# Patient Record
Sex: FEMALE | Race: WHITE | NOT HISPANIC OR LATINO | Employment: FULL TIME | URBAN - METROPOLITAN AREA
[De-identification: names, ages, dates, MRNs, and addresses within clinical notes are randomized per-mention and may not be internally consistent; named-entity substitution may affect disease eponyms.]

---

## 2020-12-18 ENCOUNTER — OFFICE VISIT (OUTPATIENT)
Dept: OBSTETRICS AND GYNECOLOGY | Facility: CLINIC | Age: 40
End: 2020-12-18

## 2020-12-18 VITALS
HEIGHT: 68 IN | SYSTOLIC BLOOD PRESSURE: 130 MMHG | BODY MASS INDEX: 37.28 KG/M2 | DIASTOLIC BLOOD PRESSURE: 80 MMHG | WEIGHT: 246 LBS

## 2020-12-18 DIAGNOSIS — N64.4 BREAST TENDERNESS: Primary | ICD-10-CM

## 2020-12-18 DIAGNOSIS — N64.89 BREAST ASYMMETRY: ICD-10-CM

## 2020-12-18 PROCEDURE — 99203 OFFICE O/P NEW LOW 30 MIN: CPT | Performed by: NURSE PRACTITIONER

## 2020-12-18 NOTE — PROGRESS NOTES
"Chief Complaint   Patient presents with   • Breast Problem     New patient - Pt c/o swelling and pain in right breast.         SUBJECTIVE:     Quin Carrington is a 39 y.o. No obstetric history on file. who presents with breast swelling and pain for 4-5 months.  She denies any dimpling, puckering, nipple changes, or drainage from nipple. Reports right breast is now much smaller than the left she first noticed 4-5 months ago. She has significant pain throughout the right breast. This is a new problem. LMP 11/27/2020. She has never completed mammogram. She denies family hx of breast cancer. She drinks several servings of caffeine per day    This is my first time meeting Quin Carrington  She is new to our office    Past Medical History:   Diagnosis Date   • Patient denies medical problems       Past Surgical History:   Procedure Laterality Date   • TUBAL ABDOMINAL LIGATION        Social History     Tobacco Use   • Smoking status: Current Every Day Smoker   Substance Use Topics   • Alcohol use: Not on file   • Drug use: Not on file     OB History   No obstetric history on file.        Review of Systems   Constitutional: Negative for chills, fatigue and fever.   Gastrointestinal: Negative for abdominal distention, abdominal pain, nausea and vomiting.   Genitourinary: Negative for menstrual problem, vaginal bleeding, vaginal discharge and vaginal pain.        + right breast pain  + left breast swelling   Musculoskeletal: Negative for back pain and gait problem.   Skin: Negative for rash.   Neurological: Negative for dizziness and headaches.   Hematological: Does not bruise/bleed easily.   Psychiatric/Behavioral: Negative for behavioral problems.       OBJECTIVE:   Vitals:    12/18/20 1321   BP: 130/80   Weight: 112 kg (246 lb)   Height: 172.7 cm (68\")        Physical Exam  Vitals signs and nursing note reviewed.   Constitutional:       Appearance: Normal appearance.   HENT:      Head: Normocephalic and atraumatic. "   Neck:      Musculoskeletal: Normal range of motion.   Cardiovascular:      Rate and Rhythm: Normal rate.   Pulmonary:      Effort: Pulmonary effort is normal.   Chest:      Breasts: Breasts are asymmetrical (right breast significantly smaller than left).         Right: Tenderness present. No swelling, bleeding, inverted nipple, mass, nipple discharge or skin change.         Left: Tenderness present. No swelling, bleeding, inverted nipple, mass, nipple discharge or skin change.   Musculoskeletal: Normal range of motion.   Lymphadenopathy:      Upper Body:      Right upper body: No supraclavicular, axillary or pectoral adenopathy.      Left upper body: No supraclavicular, axillary or pectoral adenopathy.   Skin:     General: Skin is warm and dry.   Neurological:      General: No focal deficit present.      Mental Status: She is alert and oriented to person, place, and time.      Cranial Nerves: No cranial nerve deficit.   Psychiatric:         Mood and Affect: Mood normal.         Behavior: Behavior normal.         Thought Content: Thought content normal.         Judgment: Judgment normal.       ASSESSMENT:   1) Right breast pain  2) New onset breast asymmetry    PLAN:   Encouraged decreased caffeine intake  Bilateral Dx mammogram and bilateral breast u/s ordered  Will call with results  Encouraged to schedule annual GYN exam    Follow up: PRN and annually      Carmen Fernandez, APRN  12/18/2020  13:29 EST

## 2021-02-02 ENCOUNTER — TELEPHONE (OUTPATIENT)
Dept: OBSTETRICS AND GYNECOLOGY | Facility: CLINIC | Age: 41
End: 2021-02-02

## 2021-08-25 ENCOUNTER — APPOINTMENT (OUTPATIENT)
Dept: GENERAL RADIOLOGY | Facility: HOSPITAL | Age: 41
End: 2021-08-25

## 2021-08-25 ENCOUNTER — HOSPITAL ENCOUNTER (EMERGENCY)
Facility: HOSPITAL | Age: 41
Discharge: LEFT BEFORE DISCHARGE | End: 2021-08-25

## 2021-08-25 VITALS — TEMPERATURE: 96.9 F | HEIGHT: 68 IN | HEART RATE: 112 BPM | BODY MASS INDEX: 37.4 KG/M2 | OXYGEN SATURATION: 98 %

## 2021-08-25 PROCEDURE — 99211 OFF/OP EST MAY X REQ PHY/QHP: CPT

## 2021-08-25 NOTE — ED TRIAGE NOTES
Pt ambulated to triage desk with mask in place and c/o nasal congestion, short of air, back pain on inspiration, and has had 2 negative COVID tests in the last week (last one was yesterday). This RN wearing mask and eye protection with hand hygiene prior to and after encounter.

## 2021-12-22 ENCOUNTER — HOSPITAL ENCOUNTER (OUTPATIENT)
Facility: HOSPITAL | Age: 41
Setting detail: OBSERVATION
Discharge: HOME | End: 2021-12-24
Attending: EMERGENCY MEDICINE | Admitting: HOSPITALIST

## 2021-12-22 ENCOUNTER — APPOINTMENT (OUTPATIENT)
Dept: CT IMAGING | Facility: HOSPITAL | Age: 41
End: 2021-12-22

## 2021-12-22 DIAGNOSIS — U07.1 COVID-19: ICD-10-CM

## 2021-12-22 DIAGNOSIS — M54.50 ACUTE BILATERAL LOW BACK PAIN WITHOUT SCIATICA: Primary | ICD-10-CM

## 2021-12-22 DIAGNOSIS — R50.9 FEVER AND CHILLS: ICD-10-CM

## 2021-12-22 LAB
ALBUMIN SERPL-MCNC: 4.2 G/DL (ref 3.5–5.2)
ALBUMIN/GLOB SERPL: 1.6 G/DL
ALP SERPL-CCNC: 72 U/L (ref 39–117)
ALT SERPL W P-5'-P-CCNC: 15 U/L (ref 1–33)
ANION GAP SERPL CALCULATED.3IONS-SCNC: 11.8 MMOL/L (ref 5–15)
AST SERPL-CCNC: 12 U/L (ref 1–32)
BASOPHILS # BLD AUTO: 0.02 10*3/MM3 (ref 0–0.2)
BASOPHILS NFR BLD AUTO: 0.4 % (ref 0–1.5)
BILIRUB SERPL-MCNC: 0.3 MG/DL (ref 0–1.2)
BILIRUB UR QL STRIP: NEGATIVE
BUN SERPL-MCNC: 14 MG/DL (ref 6–20)
BUN/CREAT SERPL: 21.2 (ref 7–25)
CALCIUM SPEC-SCNC: 9.2 MG/DL (ref 8.6–10.5)
CHLORIDE SERPL-SCNC: 106 MMOL/L (ref 98–107)
CLARITY UR: CLEAR
CO2 SERPL-SCNC: 21.2 MMOL/L (ref 22–29)
COLOR UR: YELLOW
CREAT SERPL-MCNC: 0.66 MG/DL (ref 0.57–1)
CRP SERPL-MCNC: 0.37 MG/DL (ref 0–0.5)
D-LACTATE SERPL-SCNC: 0.6 MMOL/L (ref 0.5–2)
DEPRECATED RDW RBC AUTO: 41 FL (ref 37–54)
EOSINOPHIL # BLD AUTO: 0.07 10*3/MM3 (ref 0–0.4)
EOSINOPHIL NFR BLD AUTO: 1.3 % (ref 0.3–6.2)
ERYTHROCYTE [DISTWIDTH] IN BLOOD BY AUTOMATED COUNT: 11.7 % (ref 12.3–15.4)
ERYTHROCYTE [SEDIMENTATION RATE] IN BLOOD: 10 MM/HR (ref 0–20)
GFR SERPL CREATININE-BSD FRML MDRD: 98 ML/MIN/1.73
GLOBULIN UR ELPH-MCNC: 2.7 GM/DL
GLUCOSE SERPL-MCNC: 104 MG/DL (ref 65–99)
GLUCOSE UR STRIP-MCNC: NEGATIVE MG/DL
HCG SERPL QL: NEGATIVE
HCT VFR BLD AUTO: 38 % (ref 34–46.6)
HGB BLD-MCNC: 13.6 G/DL (ref 12–15.9)
HGB UR QL STRIP.AUTO: NEGATIVE
HOLD SPECIMEN: NORMAL
IMM GRANULOCYTES # BLD AUTO: 0.03 10*3/MM3 (ref 0–0.05)
IMM GRANULOCYTES NFR BLD AUTO: 0.5 % (ref 0–0.5)
KETONES UR QL STRIP: NEGATIVE
LEUKOCYTE ESTERASE UR QL STRIP.AUTO: NEGATIVE
LIPASE SERPL-CCNC: 26 U/L (ref 13–60)
LYMPHOCYTES # BLD AUTO: 0.7 10*3/MM3 (ref 0.7–3.1)
LYMPHOCYTES NFR BLD AUTO: 12.8 % (ref 19.6–45.3)
MCH RBC QN AUTO: 34.5 PG (ref 26.6–33)
MCHC RBC AUTO-ENTMCNC: 35.8 G/DL (ref 31.5–35.7)
MCV RBC AUTO: 96.4 FL (ref 79–97)
MONOCYTES # BLD AUTO: 0.41 10*3/MM3 (ref 0.1–0.9)
MONOCYTES NFR BLD AUTO: 7.5 % (ref 5–12)
NEUTROPHILS NFR BLD AUTO: 4.24 10*3/MM3 (ref 1.7–7)
NEUTROPHILS NFR BLD AUTO: 77.5 % (ref 42.7–76)
NITRITE UR QL STRIP: NEGATIVE
NRBC BLD AUTO-RTO: 0 /100 WBC (ref 0–0.2)
PH UR STRIP.AUTO: 8 [PH] (ref 5–8)
PLATELET # BLD AUTO: 190 10*3/MM3 (ref 140–450)
PMV BLD AUTO: 10.7 FL (ref 6–12)
POTASSIUM SERPL-SCNC: 4 MMOL/L (ref 3.5–5.2)
PROCALCITONIN SERPL-MCNC: 0.07 NG/ML (ref 0–0.25)
PROT SERPL-MCNC: 6.9 G/DL (ref 6–8.5)
PROT UR QL STRIP: NEGATIVE
RBC # BLD AUTO: 3.94 10*6/MM3 (ref 3.77–5.28)
SARS-COV-2 RNA PNL SPEC NAA+PROBE: DETECTED
SODIUM SERPL-SCNC: 139 MMOL/L (ref 136–145)
SP GR UR STRIP: 1.02 (ref 1–1.03)
UROBILINOGEN UR QL STRIP: NORMAL
WBC NRBC COR # BLD: 5.47 10*3/MM3 (ref 3.4–10.8)
WHOLE BLOOD HOLD SPECIMEN: NORMAL
WHOLE BLOOD HOLD SPECIMEN: NORMAL

## 2021-12-22 PROCEDURE — 80053 COMPREHEN METABOLIC PANEL: CPT | Performed by: EMERGENCY MEDICINE

## 2021-12-22 PROCEDURE — G0378 HOSPITAL OBSERVATION PER HR: HCPCS

## 2021-12-22 PROCEDURE — 85025 COMPLETE CBC W/AUTO DIFF WBC: CPT | Performed by: EMERGENCY MEDICINE

## 2021-12-22 PROCEDURE — 25010000002 VANCOMYCIN 10 G RECONSTITUTED SOLUTION: Performed by: EMERGENCY MEDICINE

## 2021-12-22 PROCEDURE — 87040 BLOOD CULTURE FOR BACTERIA: CPT | Performed by: EMERGENCY MEDICINE

## 2021-12-22 PROCEDURE — 87635 SARS-COV-2 COVID-19 AMP PRB: CPT | Performed by: EMERGENCY MEDICINE

## 2021-12-22 PROCEDURE — 36415 COLL VENOUS BLD VENIPUNCTURE: CPT | Performed by: EMERGENCY MEDICINE

## 2021-12-22 PROCEDURE — 86140 C-REACTIVE PROTEIN: CPT | Performed by: EMERGENCY MEDICINE

## 2021-12-22 PROCEDURE — 96375 TX/PRO/DX INJ NEW DRUG ADDON: CPT

## 2021-12-22 PROCEDURE — C9803 HOPD COVID-19 SPEC COLLECT: HCPCS

## 2021-12-22 PROCEDURE — 84145 PROCALCITONIN (PCT): CPT | Performed by: EMERGENCY MEDICINE

## 2021-12-22 PROCEDURE — 99284 EMERGENCY DEPT VISIT MOD MDM: CPT

## 2021-12-22 PROCEDURE — 81003 URINALYSIS AUTO W/O SCOPE: CPT | Performed by: EMERGENCY MEDICINE

## 2021-12-22 PROCEDURE — 25010000002 PIPERACILLIN SOD-TAZOBACTAM PER 1 G: Performed by: EMERGENCY MEDICINE

## 2021-12-22 PROCEDURE — 25010000002 KETOROLAC TROMETHAMINE PER 15 MG: Performed by: EMERGENCY MEDICINE

## 2021-12-22 PROCEDURE — 83605 ASSAY OF LACTIC ACID: CPT | Performed by: EMERGENCY MEDICINE

## 2021-12-22 PROCEDURE — 74176 CT ABD & PELVIS W/O CONTRAST: CPT

## 2021-12-22 PROCEDURE — 83690 ASSAY OF LIPASE: CPT | Performed by: EMERGENCY MEDICINE

## 2021-12-22 PROCEDURE — 96374 THER/PROPH/DIAG INJ IV PUSH: CPT

## 2021-12-22 PROCEDURE — 96365 THER/PROPH/DIAG IV INF INIT: CPT

## 2021-12-22 PROCEDURE — 85652 RBC SED RATE AUTOMATED: CPT | Performed by: EMERGENCY MEDICINE

## 2021-12-22 PROCEDURE — 84703 CHORIONIC GONADOTROPIN ASSAY: CPT | Performed by: EMERGENCY MEDICINE

## 2021-12-22 RX ORDER — KETOROLAC TROMETHAMINE 15 MG/ML
15 INJECTION, SOLUTION INTRAMUSCULAR; INTRAVENOUS ONCE
Status: COMPLETED | OUTPATIENT
Start: 2021-12-22 | End: 2021-12-22

## 2021-12-22 RX ORDER — DEXAMETHASONE SODIUM PHOSPHATE 10 MG/ML
6 INJECTION INTRAMUSCULAR; INTRAVENOUS DAILY
Status: DISCONTINUED | OUTPATIENT
Start: 2021-12-23 | End: 2021-12-24

## 2021-12-22 RX ORDER — ACETAMINOPHEN 500 MG
1000 TABLET ORAL ONCE
Status: COMPLETED | OUTPATIENT
Start: 2021-12-22 | End: 2021-12-22

## 2021-12-22 RX ORDER — NITROGLYCERIN 0.4 MG/1
0.4 TABLET SUBLINGUAL
Status: DISCONTINUED | OUTPATIENT
Start: 2021-12-22 | End: 2021-12-23

## 2021-12-22 RX ORDER — SODIUM CHLORIDE 0.9 % (FLUSH) 0.9 %
10 SYRINGE (ML) INJECTION AS NEEDED
Status: DISCONTINUED | OUTPATIENT
Start: 2021-12-22 | End: 2021-12-24 | Stop reason: HOSPADM

## 2021-12-22 RX ADMIN — ACETAMINOPHEN 1000 MG: 500 TABLET ORAL at 18:31

## 2021-12-22 RX ADMIN — SODIUM CHLORIDE 1000 ML: 9 INJECTION, SOLUTION INTRAVENOUS at 17:41

## 2021-12-22 RX ADMIN — TAZOBACTAM SODIUM AND PIPERACILLIN SODIUM 3.38 G: 375; 3 INJECTION, SOLUTION INTRAVENOUS at 21:31

## 2021-12-22 RX ADMIN — KETOROLAC TROMETHAMINE 15 MG: 15 INJECTION, SOLUTION INTRAMUSCULAR; INTRAVENOUS at 17:37

## 2021-12-22 RX ADMIN — VANCOMYCIN HYDROCHLORIDE 1750 MG: 10 INJECTION, POWDER, LYOPHILIZED, FOR SOLUTION INTRAVENOUS at 22:19

## 2021-12-22 NOTE — ED PROVIDER NOTES
EMERGENCY DEPARTMENT ENCOUNTER    Room Number:  N541/1  Date of encounter:  12/22/2021  PCP: Provider, No Known  Historian: Patient      H  Chief Complaint:   A complete HPI/ROS/PMH/PSH/SH/FH are unobtainable due to: None    Context: Quin Carrington is a 40 y.o. female who presents to the ED c/o low back pain and fever.  Severe bilateral low back pain for the last several days which is nonradiating.  Is worse with movement nothing makes it better or worse.  Patient was treated for UTI with antibiotics but has not gotten better.  This was done at [facility].  She went to work today and felt very poorly and fatigued and was found to have a fever of 101.    Patient had no cough or shortness of breath and no other associated symptoms other than myalgias and fatigue.  She said no recent procedures, no reason to have bacteremia, no history of IV drugs.      PAST MEDICAL HISTORY  Active Ambulatory Problems     Diagnosis Date Noted   • No Active Ambulatory Problems     Resolved Ambulatory Problems     Diagnosis Date Noted   • No Resolved Ambulatory Problems     Past Medical History:   Diagnosis Date   • Patient denies medical problems          PAST SURGICAL HISTORY  Past Surgical History:   Procedure Laterality Date   • TUBAL ABDOMINAL LIGATION           FAMILY HISTORY  Family History   Problem Relation Age of Onset   • No Known Problems Father    • No Known Problems Mother          SOCIAL HISTORY  Social History     Socioeconomic History   • Marital status: Single   Tobacco Use   • Smoking status: Current Every Day Smoker         ALLERGIES  Sulfa antibiotics        REVIEW OF SYSTEMS  Review of Systems     All systems reviewed and negative except for those discussed in HPI.       PHYSICAL EXAM    I have reviewed the triage vital signs and nursing notes.    ED Triage Vitals [12/22/21 1643]   Temp Heart Rate Resp BP SpO2   (!) 101.1 °F (38.4 °C) 106 18 -- 100 %      Temp src Heart Rate Source Patient Position BP  Location FiO2 (%)   -- Monitor -- -- --       Physical Exam  GENERAL: Nontoxic-appearing  HENT: nares patent  EYES: no scleral icterus  CV: Sinus tachycardia  RESPIRATORY: normal effort  ABDOMEN: soft, nontender palpation, bowel sounds present.  No CVA tenderness.  There is localized tenderness palpation through the mid lower lumbar spine with no swelling or step-off.  MUSCULOSKELETAL: no deformity.  Normal neuro exam with no signs of lumbar radiculopathy or radicular symptoms.  NEURO: alert, moves all extremities, follows commands  SKIN: warm, dry        LAB RESULTS  Recent Results (from the past 24 hour(s))   Comprehensive Metabolic Panel    Collection Time: 12/22/21  4:52 PM    Specimen: Blood   Result Value Ref Range    Glucose 104 (H) 65 - 99 mg/dL    BUN 14 6 - 20 mg/dL    Creatinine 0.66 0.57 - 1.00 mg/dL    Sodium 139 136 - 145 mmol/L    Potassium 4.0 3.5 - 5.2 mmol/L    Chloride 106 98 - 107 mmol/L    CO2 21.2 (L) 22.0 - 29.0 mmol/L    Calcium 9.2 8.6 - 10.5 mg/dL    Total Protein 6.9 6.0 - 8.5 g/dL    Albumin 4.20 3.50 - 5.20 g/dL    ALT (SGPT) 15 1 - 33 U/L    AST (SGOT) 12 1 - 32 U/L    Alkaline Phosphatase 72 39 - 117 U/L    Total Bilirubin 0.3 0.0 - 1.2 mg/dL    eGFR Non African Amer 98 >60 mL/min/1.73    Globulin 2.7 gm/dL    A/G Ratio 1.6 g/dL    BUN/Creatinine Ratio 21.2 7.0 - 25.0    Anion Gap 11.8 5.0 - 15.0 mmol/L   Lipase    Collection Time: 12/22/21  4:52 PM    Specimen: Blood   Result Value Ref Range    Lipase 26 13 - 60 U/L   Urinalysis With Microscopic If Indicated (No Culture) - Urine, Clean Catch    Collection Time: 12/22/21  4:52 PM    Specimen: Urine, Clean Catch   Result Value Ref Range    Color, UA Yellow Yellow, Straw    Appearance, UA Clear Clear    pH, UA 8.0 5.0 - 8.0    Specific Gravity, UA 1.021 1.005 - 1.030    Glucose, UA Negative Negative    Ketones, UA Negative Negative    Bilirubin, UA Negative Negative    Blood, UA Negative Negative    Protein, UA Negative Negative    Leuk  Esterase, UA Negative Negative    Nitrite, UA Negative Negative    Urobilinogen, UA 1.0 E.U./dL 0.2 - 1.0 E.U./dL   hCG, Serum, Qualitative    Collection Time: 12/22/21  4:52 PM    Specimen: Blood   Result Value Ref Range    HCG Qualitative Negative Negative   Green Top (Gel)    Collection Time: 12/22/21  4:52 PM   Result Value Ref Range    Extra Tube Hold for add-ons.    Lavender Top    Collection Time: 12/22/21  4:52 PM   Result Value Ref Range    Extra Tube hold for add-on    Light Blue Top    Collection Time: 12/22/21  4:52 PM   Result Value Ref Range    Extra Tube hold for add-on    CBC Auto Differential    Collection Time: 12/22/21  4:52 PM    Specimen: Blood   Result Value Ref Range    WBC 5.47 3.40 - 10.80 10*3/mm3    RBC 3.94 3.77 - 5.28 10*6/mm3    Hemoglobin 13.6 12.0 - 15.9 g/dL    Hematocrit 38.0 34.0 - 46.6 %    MCV 96.4 79.0 - 97.0 fL    MCH 34.5 (H) 26.6 - 33.0 pg    MCHC 35.8 (H) 31.5 - 35.7 g/dL    RDW 11.7 (L) 12.3 - 15.4 %    RDW-SD 41.0 37.0 - 54.0 fl    MPV 10.7 6.0 - 12.0 fL    Platelets 190 140 - 450 10*3/mm3    Neutrophil % 77.5 (H) 42.7 - 76.0 %    Lymphocyte % 12.8 (L) 19.6 - 45.3 %    Monocyte % 7.5 5.0 - 12.0 %    Eosinophil % 1.3 0.3 - 6.2 %    Basophil % 0.4 0.0 - 1.5 %    Immature Grans % 0.5 0.0 - 0.5 %    Neutrophils, Absolute 4.24 1.70 - 7.00 10*3/mm3    Lymphocytes, Absolute 0.70 0.70 - 3.10 10*3/mm3    Monocytes, Absolute 0.41 0.10 - 0.90 10*3/mm3    Eosinophils, Absolute 0.07 0.00 - 0.40 10*3/mm3    Basophils, Absolute 0.02 0.00 - 0.20 10*3/mm3    Immature Grans, Absolute 0.03 0.00 - 0.05 10*3/mm3    nRBC 0.0 0.0 - 0.2 /100 WBC   Sedimentation Rate    Collection Time: 12/22/21  4:52 PM    Specimen: Blood   Result Value Ref Range    Sed Rate 10 0 - 20 mm/hr   C-reactive Protein    Collection Time: 12/22/21  4:52 PM    Specimen: Blood   Result Value Ref Range    C-Reactive Protein 0.37 0.00 - 0.50 mg/dL   Procalcitonin    Collection Time: 12/22/21  4:52 PM    Specimen: Blood    Result Value Ref Range    Procalcitonin 0.07 0.00 - 0.25 ng/mL   Lactic Acid, Plasma    Collection Time: 12/22/21  8:17 PM    Specimen: Blood   Result Value Ref Range    Lactate 0.6 0.5 - 2.0 mmol/L   COVID-19,[facility] IN-HOUSE CEPHEID/YAYA NP SWAB IN TRANSPORT MEDIA 8-12 HR TAT - Swab, Nasopharynx    Collection Time: 12/22/21  8:18 PM    Specimen: Nasopharynx; Swab   Result Value Ref Range    COVID19 Detected (C) Not Detected - Ref. Range       Ordered the above labs and independently reviewed the results.        RADIOLOGY  CT Abdomen Pelvis Without Contrast    Result Date: 12/22/2021  CT ABDOMEN PELVIS WO CONTRAST-  CLINICAL HISTORY: Bilateral flank pain. Fever.  TECHNIQUE: Spiral CT images were acquired through the abdomen and pelvis with no oral or IV contrast and were reconstructed in 3 mm thick slices.  Radiation dose reduction techniques were utilized, including automated exposure control and exposure modulation based on body size.  COMPARISON: None  FINDINGS: Both kidneys are normal in size and shape and show no hydronephrosis. No radiopaque calculi are present within either renal collecting system or in the urinary bladder. No cystic or solid masses are identified. The liver and spleen and pancreas and adrenal glands appear within normal limits. The stomach and small and large bowel are unremarkable. There is no bowel distention. The uterus and ovaries are unremarkable except for the presence of bilateral tubal ligation clips. No abnormal masses or fluid collections are identified in the abdomen or pelvis. There are no hernias.      Unremarkable CT scan of the abdomen and pelvis. No cause for the patient's flank pain identified.  This report was finalized on 12/22/2021 7:51 PM by Dr. Sanjay Velez M.D.        I ordered the above noted radiological studies. Reviewed by me and discussed with radiologist.  See dictation for official radiology interpretation.      PROCEDURES    Procedures      MEDICATIONS  GIVEN IN ER    Medications   sodium chloride 0.9 % flush 10 mL (has no administration in time range)   enoxaparin (LOVENOX) syringe 40 mg (has no administration in time range)   dexamethasone (DECADRON) injection 6 mg (has no administration in time range)   nitroglycerin (NITROSTAT) SL tablet 0.4 mg (has no administration in time range)   sodium chloride 0.9 % bolus 1,000 mL (0 mL Intravenous Stopped 12/22/21 1856)   ketorolac (TORADOL) injection 15 mg (15 mg Intravenous Given 12/22/21 1737)   acetaminophen (TYLENOL) tablet 1,000 mg (1,000 mg Oral Given 12/22/21 1831)   vancomycin 1750 mg/500 mL 0.9% NS IVPB ([facility]) (1,750 mg Intravenous New Bag 12/22/21 2219)   piperacillin-tazobactam (ZOSYN) 3.375 g in iso-osmotic dextrose 50 ml (premix) (3.375 g Intravenous New Bag 12/22/21 2131)         PROGRESS, DATA ANALYSIS, CONSULTS, AND MEDICAL DECISION MAKING    All labs have been independently reviewed by me.  All radiology studies have been reviewed by me and discussed with radiologist dictating the report.   EKG's independently viewed and interpreted by me.  Discussion below represents my analysis of pertinent findings related to patient's condition, differential diagnosis, treatment plan and final disposition.        ED Course as of 12/22/21 2301   Wed Dec 22, 2021   2234 I spoke with Dr. Barrios who agrees to admit the patient.  She does have COVID-19, but no pulmonary findings.  The fever myalgias could be from COVID-19, but the focal midline back pain is concerning.  I have done blood cultures and given her empiric IV antibiotics.  No steroids are indicated for the COVID-19 at this point. [DP]   2300 Patient's present his normal CBC and chemistry [DP]   2300 Urinalysis negative [DP]   2300 Lactate and pro-Isreal normal [DP]   2300 hCG negative [DP]   2300 CT scan of the abdomen pelvis shows no acute process.  There is no urolithiasis or inflammatory changes in the collecting system or pelvis. [DP]   2300 Surprisingly her  Covid screen was positive. [DP]   2300 Certainly no sign of sepsis at this point, but I am still concerned about the localized pain in the back and the possibility of infection. [DP]      ED Course User Index  [DP] Ever Ames MD           PPE: The patient wore a surgical mask throughout the entire patient encounter. I wore an N95, eye protection gown and gloves    AS OF 23:01 EST VITALS:    BP - 116/57  HR - 75  TEMP - 99.5 °F (37.5 °C) (Oral)  O2 SATS - 95%        DIAGNOSIS  Final diagnoses:   Acute bilateral low back pain without sciatica   Fever and chills   COVID-19         DISPOSITION  Admit           Ever Ames MD  12/22/21 2301

## 2021-12-22 NOTE — ED NOTES
Pt refused COVID swab. This RN explained to RN that getting a swab is important for pt care. Pt okay'd. This RN will return to swab pt.      Hilaria Perez, LINDSAY  12/22/21 0867

## 2021-12-22 NOTE — ED TRIAGE NOTES
Bilateral flank pain started 1 week ago, pt also reports urinary frequency, pt states hx of kidney infections.     Pt arrives in triage with mask on. Triage staff wearing N95 masks and goggles.

## 2021-12-23 ENCOUNTER — APPOINTMENT (OUTPATIENT)
Dept: GENERAL RADIOLOGY | Facility: HOSPITAL | Age: 41
End: 2021-12-23

## 2021-12-23 PROCEDURE — 94799 UNLISTED PULMONARY SVC/PX: CPT

## 2021-12-23 PROCEDURE — 94640 AIRWAY INHALATION TREATMENT: CPT

## 2021-12-23 PROCEDURE — G0378 HOSPITAL OBSERVATION PER HR: HCPCS

## 2021-12-23 PROCEDURE — 25010000002 DEXAMETHASONE PER 1 MG: Performed by: HOSPITALIST

## 2021-12-23 PROCEDURE — 96375 TX/PRO/DX INJ NEW DRUG ADDON: CPT

## 2021-12-23 PROCEDURE — 96372 THER/PROPH/DIAG INJ SC/IM: CPT

## 2021-12-23 PROCEDURE — 71045 X-RAY EXAM CHEST 1 VIEW: CPT

## 2021-12-23 PROCEDURE — 25010000002 ENOXAPARIN PER 10 MG: Performed by: HOSPITALIST

## 2021-12-23 RX ORDER — GUAIFENESIN 600 MG/1
600 TABLET, EXTENDED RELEASE ORAL EVERY 12 HOURS SCHEDULED
Status: DISCONTINUED | OUTPATIENT
Start: 2021-12-23 | End: 2021-12-24 | Stop reason: HOSPADM

## 2021-12-23 RX ORDER — ALBUTEROL SULFATE 2.5 MG/3ML
2.5 SOLUTION RESPIRATORY (INHALATION) EVERY 6 HOURS PRN
Status: DISCONTINUED | OUTPATIENT
Start: 2021-12-23 | End: 2021-12-24 | Stop reason: HOSPADM

## 2021-12-23 RX ORDER — BUDESONIDE AND FORMOTEROL FUMARATE DIHYDRATE 160; 4.5 UG/1; UG/1
2 AEROSOL RESPIRATORY (INHALATION)
Status: DISCONTINUED | OUTPATIENT
Start: 2021-12-23 | End: 2021-12-24 | Stop reason: HOSPADM

## 2021-12-23 RX ORDER — ACETAMINOPHEN 500 MG
TABLET ORAL
Status: COMPLETED
Start: 2021-12-23 | End: 2021-12-23

## 2021-12-23 RX ORDER — KETOROLAC TROMETHAMINE 15 MG/ML
15 INJECTION, SOLUTION INTRAMUSCULAR; INTRAVENOUS ONCE
Status: DISCONTINUED | OUTPATIENT
Start: 2021-12-23 | End: 2021-12-23

## 2021-12-23 RX ORDER — ACETAMINOPHEN 500 MG
1000 TABLET ORAL ONCE
Status: DISCONTINUED | OUTPATIENT
Start: 2021-12-23 | End: 2021-12-23

## 2021-12-23 RX ORDER — CETIRIZINE HYDROCHLORIDE 10 MG/1
10 TABLET ORAL DAILY
Status: DISCONTINUED | OUTPATIENT
Start: 2021-12-23 | End: 2021-12-24

## 2021-12-23 RX ORDER — FAMOTIDINE 20 MG/1
20 TABLET, FILM COATED ORAL 2 TIMES DAILY
Status: DISCONTINUED | OUTPATIENT
Start: 2021-12-23 | End: 2021-12-24 | Stop reason: HOSPADM

## 2021-12-23 RX ORDER — ACETAMINOPHEN 325 MG/1
650 TABLET ORAL EVERY 6 HOURS PRN
Status: DISCONTINUED | OUTPATIENT
Start: 2021-12-23 | End: 2021-12-24 | Stop reason: HOSPADM

## 2021-12-23 RX ORDER — ZINC SULFATE 50(220)MG
220 CAPSULE ORAL DAILY
Status: DISCONTINUED | OUTPATIENT
Start: 2021-12-23 | End: 2021-12-24

## 2021-12-23 RX ORDER — ASCORBIC ACID 500 MG
500 TABLET ORAL DAILY
Status: DISCONTINUED | OUTPATIENT
Start: 2021-12-23 | End: 2021-12-24

## 2021-12-23 RX ORDER — MELATONIN
1000 DAILY
Status: DISCONTINUED | OUTPATIENT
Start: 2021-12-23 | End: 2021-12-24 | Stop reason: HOSPADM

## 2021-12-23 RX ADMIN — CETIRIZINE HYDROCHLORIDE 10 MG: 10 TABLET ORAL at 15:06

## 2021-12-23 RX ADMIN — ENOXAPARIN SODIUM 40 MG: 40 INJECTION SUBCUTANEOUS at 20:36

## 2021-12-23 RX ADMIN — FAMOTIDINE 20 MG: 20 TABLET, FILM COATED ORAL at 15:05

## 2021-12-23 RX ADMIN — Medication 220 MG: at 15:06

## 2021-12-23 RX ADMIN — GUAIFENESIN 600 MG: 600 TABLET, EXTENDED RELEASE ORAL at 20:36

## 2021-12-23 RX ADMIN — ACETAMINOPHEN 500 MG: 500 TABLET ORAL at 00:17

## 2021-12-23 RX ADMIN — BUDESONIDE AND FORMOTEROL FUMARATE DIHYDRATE 2 PUFF: 160; 4.5 AEROSOL RESPIRATORY (INHALATION) at 20:53

## 2021-12-23 RX ADMIN — OXYCODONE HYDROCHLORIDE AND ACETAMINOPHEN 500 MG: 500 TABLET ORAL at 15:05

## 2021-12-23 RX ADMIN — GUAIFENESIN 600 MG: 600 TABLET, EXTENDED RELEASE ORAL at 15:05

## 2021-12-23 RX ADMIN — DEXAMETHASONE SODIUM PHOSPHATE 6 MG: 10 INJECTION INTRAMUSCULAR; INTRAVENOUS at 08:41

## 2021-12-23 RX ADMIN — Medication 1000 UNITS: at 15:05

## 2021-12-23 RX ADMIN — ACETAMINOPHEN 650 MG: 325 TABLET, FILM COATED ORAL at 08:40

## 2021-12-23 RX ADMIN — FAMOTIDINE 20 MG: 20 TABLET, FILM COATED ORAL at 20:36

## 2021-12-23 NOTE — CONSULTS
LOS: 1 day   Patient Care Team:  Provider, No Known as PCP - General        Subjective       Attending MD : Abdoul Barrios MD    Patient Complaints: SOB         History of Present Illness  :40-year-old white female with no medical problems no medication presented to [facility] emergency room with low back pain myalgia fever chills but denies any shortness of breath or cough.  Patient work-up in ER revealed COVID-19 infection admit for management.  Patient also denies any loss of taste or smell.  Patient did not receive COVID-19 vaccine.        Patient Denies:  NV    PMH :   Acute bilateral low back pain without sciatica      Review of Systems:    SOB    Objective     Vital Signs  Temp:  [98 °F (36.7 °C)-101.1 °F (38.4 °C)] 98 °F (36.7 °C)  Heart Rate:  [] 52  Resp:  [16-18] 16  BP: (101-131)/(55-74) 101/55    Physical Exam:     General Appearance:    Alert, cooperative, in no acute distress   Head:    Normocephalic, without obvious abnormality, atraumatic   Eyes:            Lids and lashes normal, conjunctivae and sclerae normal, no   icterus, no pallor, corneas clear, PERRLA   Ears:    Ears appear intact with no abnormalities noted   Throat:   No oral lesions, no thrush, oral mucosa moist   Neck:   No adenopathy, supple, trachea midline, no thyromegaly, no     carotid bruit, no JVD   Back:     No kyphosis present, no scoliosis present, no skin lesions,       erythema or scars, no tenderness to percussion or                   palpation,   range of motion normal   Lungs:     Clear to auscultation,respirations regular, even and                   unlabored    Heart:    Regular rhythm and normal rate, normal S1 and S2, no            murmur, no gallop, no rub, no click   Breast Exam:    Deferred   Abdomen:     Normal bowel sounds, no masses, no organomegaly, soft        non-tender, non-distended, no guarding, no rebound                 tenderness   Genitalia:    Deferred   Extremities:   Moves all  extremities well, no edema, no cyanosis, no              redness   Pulses:   Pulses palpable and equal bilaterally   Skin:   No bleeding, bruising or rash   Lymph nodes:   No palpable adenopathy   Neurologic:   Cranial nerves 2 - 12 grossly intact, sensation intact, DTR        present and equal bilaterally          Results Review:    Lab Results (last 72 hours)     Procedure Component Value Units Date/Time    COVID PRE-OP / PRE-PROCEDURE SCREENING ORDER (NO ISOLATION) - Swab, Nasopharynx [[id number]]  (Abnormal) Collected: 12/22/21 2018    Specimen: Swab from Nasopharynx Updated: 12/22/21 2055    Narrative:      The following orders were created for panel order COVID PRE-OP / PRE-PROCEDURE SCREENING ORDER (NO ISOLATION) - Swab, Nasopharynx.  Procedure                               Abnormality         Status                     ---------                               -----------         ------                     COVID-19,[facility]HOU[facility]...[[id number]]  Abnormal            Final result                 Please view results for these tests on the individual orders.    COVID-19,[facility] CEPHEID/YAYA NP SWAB IN TRANSPORT MEDIA 8-12 HR TAT - Swab, Nasopharynx [[id number]]  (Abnormal) Collected: 12/22/21 2018    Specimen: Swab from Nasopharynx Updated: 12/22/21 2055     COVID19 Detected    Narrative:      Fact sheet for providers: https://www.fda.gov/media/851447/download    Fact sheet for patients: https://www.fda.gov/media/012720/download    Test performed by PCR.    Procalcitonin [[id number]]  (Normal) Collected: 12/22/21 1652    Specimen: Blood Updated: 12/22/21 2044     Procalcitonin 0.07 ng/mL     Narrative:      As a Marker for Sepsis (Non-Neonates):     1. <0.5 ng/mL represents a low risk of severe sepsis and/or septic shock.  2. >2 ng/mL represents a high risk of severe sepsis and/or septic shock.    As a Marker for Lower Respiratory Tract Infections that require antibiotic therapy:  PCT on Admission      "Antibiotic Therapy             6-12 Hrs later  >0.5                          Strongly Recommended            >0.25 - <0.5             Recommended  0.1 - 0.25                  Discouraged                       Remeasure/reassess PCT  <0.1                         Strongly Discouraged         Remeasure/reassess PCT      As 28 day mortality risk marker: \"Change in Procalcitonin Result\" (>80% or <=80%) if Day 0 (or Day 1) and Day 4 values are available. Refer to http://www.[facility]-pct-calculator.com/    Change in PCT <=80 %   A decrease of PCT levels below or equal to 80% defines a positive change in PCT test result representing a higher risk for 28-day all-cause mortality of patients diagnosed with severe sepsis or septic shock.    Change in PCT >80 %   A decrease of PCT levels of more than 80% defines a negative change in PCT result representing a lower risk for 28-day all-cause mortality of patients diagnosed with severe sepsis or septic shock.                Lactic Acid, Plasma [[id number]]  (Normal) Collected: 12/22/21 2017    Specimen: Blood Updated: 12/22/21 2041     Lactate 0.6 mmol/L     C-reactive Protein [[id number]]  (Normal) Collected: 12/22/21 1652    Specimen: Blood Updated: 12/22/21 2026     C-Reactive Protein 0.37 mg/dL     Blood Culture - Blood, Arm, Left [[id number]] Collected: 12/22/21 2017    Specimen: Blood from Arm, Left Updated: 12/22/21 2023    Blood Culture - Blood, Arm, Left [[id number]] Collected: 12/22/21 2017    Specimen: Blood from Arm, Left Updated: 12/22/21 2023    Sedimentation Rate [[id number]]  (Normal) Collected: 12/22/21 1652    Specimen: Blood Updated: 12/22/21 1849     Sed Rate 10 mm/hr     Rumsey Draw [[id number]] Collected: 12/22/21 1652    Specimen: Blood Updated: 12/22/21 1801    Narrative:      The following orders were created for panel order Rumsey Draw.  Procedure                               Abnormality         Status                     ---------                               " -----------         ------                     Green Top (Gel)[[id number]]                                  Final result               Lavender Top[[id number]]                                     Final result               Gold Top - SST[[id number]]                                                              Light Blue Top[[id number]]                                   Final result                 Please view results for these tests on the individual orders.    Green Top (Gel) [[id number]] Collected: 12/22/21 1652    Specimen: Blood Updated: 12/22/21 1801     Extra Tube Hold for add-ons.     Comment: Auto resulted.       Lavender Top [[id number]] Collected: 12/22/21 1652    Specimen: Blood Updated: 12/22/21 1801     Extra Tube hold for add-on     Comment: Auto resulted       Light Blue Top [[id number]] Collected: 12/22/21 1652    Specimen: Blood Updated: 12/22/21 1801     Extra Tube hold for add-on     Comment: Auto resulted       Comprehensive Metabolic Panel [[id number]]  (Abnormal) Collected: 12/22/21 1652    Specimen: Blood Updated: 12/22/21 1720     Glucose 104 mg/dL      BUN 14 mg/dL      Creatinine 0.66 mg/dL      Sodium 139 mmol/L      Potassium 4.0 mmol/L      Chloride 106 mmol/L      CO2 21.2 mmol/L      Calcium 9.2 mg/dL      Total Protein 6.9 g/dL      Albumin 4.20 g/dL      ALT (SGPT) 15 U/L      AST (SGOT) 12 U/L      Alkaline Phosphatase 72 U/L      Total Bilirubin 0.3 mg/dL      eGFR Non African Amer 98 mL/min/1.73      Globulin 2.7 gm/dL      A/G Ratio 1.6 g/dL      BUN/Creatinine Ratio 21.2     Anion Gap 11.8 mmol/L     Narrative:      GFR Normal >60  Chronic Kidney Disease <60  Kidney Failure <15      Lipase [[id number]]  (Normal) Collected: 12/22/21 1652    Specimen: Blood Updated: 12/22/21 1720     Lipase 26 U/L     hCG, Serum, Qualitative [[id number]]  (Normal) Collected: 12/22/21 1652    Specimen: Blood Updated: 12/22/21 1712     HCG Qualitative Negative    Urinalysis With Microscopic If Indicated (No  Culture) - Urine, Clean Catch [[id number]]  (Normal) Collected: 12/22/21 1652    Specimen: Urine, Clean Catch Updated: 12/22/21 1707     Color, UA Yellow     Appearance, UA Clear     pH, UA 8.0     Specific Gravity, UA 1.021     Glucose, UA Negative     Ketones, UA Negative     Bilirubin, UA Negative     Blood, UA Negative     Protein, UA Negative     Leuk Esterase, UA Negative     Nitrite, UA Negative     Urobilinogen, UA 1.0 E.U./dL    Narrative:      Urine microscopic not indicated.    CBC & Differential [[id number]]  (Abnormal) Collected: 12/22/21 1652    Specimen: Blood Updated: 12/22/21 1704    Narrative:      The following orders were created for panel order CBC & Differential.  Procedure                               Abnormality         Status                     ---------                               -----------         ------                     CBC Auto Differential[[id number]]        Abnormal            Final result                 Please view results for these tests on the individual orders.    CBC Auto Differential [[id number]]  (Abnormal) Collected: 12/22/21 1652    Specimen: Blood Updated: 12/22/21 1704     WBC 5.47 10*3/mm3      RBC 3.94 10*6/mm3      Hemoglobin 13.6 g/dL      Hematocrit 38.0 %      MCV 96.4 fL      MCH 34.5 pg      MCHC 35.8 g/dL      RDW 11.7 %      RDW-SD 41.0 fl      MPV 10.7 fL      Platelets 190 10*3/mm3      Neutrophil % 77.5 %      Lymphocyte % 12.8 %      Monocyte % 7.5 %      Eosinophil % 1.3 %      Basophil % 0.4 %      Immature Grans % 0.5 %      Neutrophils, Absolute 4.24 10*3/mm3      Lymphocytes, Absolute 0.70 10*3/mm3      Monocytes, Absolute 0.41 10*3/mm3      Eosinophils, Absolute 0.07 10*3/mm3      Basophils, Absolute 0.02 10*3/mm3      Immature Grans, Absolute 0.03 10*3/mm3      nRBC 0.0 /100 WBC               Imaging Results (Last 72 Hours)     Procedure Component Value Units Date/Time    XR Chest 1 View [[id number]] Resulted: 12/23/21 1236     Updated: 12/23/21  1321    CT Abdomen Pelvis Without Contrast [[id number]] Collected: 12/22/21 1947     Updated: 12/22/21 1954    Narrative:      CT ABDOMEN PELVIS WO CONTRAST-     CLINICAL HISTORY: Bilateral flank pain. Fever.     TECHNIQUE: Spiral CT images were acquired through the abdomen and pelvis  with no oral or IV contrast and were reconstructed in 3 mm thick slices.     Radiation dose reduction techniques were utilized, including automated  exposure control and exposure modulation based on body size.     COMPARISON: None     FINDINGS: Both kidneys are normal in size and shape and show no  hydronephrosis. No radiopaque calculi are present within either renal  collecting system or in the urinary bladder. No cystic or solid masses  are identified. The liver and spleen and pancreas and adrenal glands  appear within normal limits. The stomach and small and large bowel are  unremarkable. There is no bowel distention. The uterus and ovaries are  unremarkable except for the presence of bilateral tubal ligation clips.  No abnormal masses or fluid collections are identified in the abdomen or  pelvis. There are no hernias.       Impression:      Unremarkable CT scan of the abdomen and pelvis. No cause for  the patient's flank pain identified.     This report was finalized on 12/22/2021 7:51 PM by Dr. Sanjay Velez M.D.               Medication Review:      Hospital Medications (active)       Dose Frequency Start End    acetaminophen (TYLENOL) tablet 650 mg 650 mg Every 6 Hours PRN 12/23/2021     Admin Instructions: Do not exceed 4 grams of acetaminophen in a 24 hr period. Max dose of 2gm for AST/ALT greater than 120 units/L      If given for pain, use the following pain scale:   Mild Pain = Pain Score of 1-3, CPOT 1-2  Moderate Pain = Pain Score of 4-6, CPOT 3-4  Severe Pain = Pain Score of 7-10, CPOT 5-8    Route: Oral    albuterol (PROVENTIL) nebulizer solution 0.083% 2.5 mg/3mL 2.5 mg Every 6 Hours PRN 12/23/2021     Route:  Nebulization    ascorbic acid (VITAMIN C) tablet 500 mg 500 mg Daily 12/23/2021     Route: Oral    budesonide-formoterol (SYMBICORT) 160-4.5 MCG/ACT inhaler 2 puff 2 puff 2 Times Daily - RT 12/23/2021     Admin Instructions:  Shake well.  Rinse mouth after use, do not swallow water.  Send aerosols to pharmacy in ziplock bag for proper disposal.    Route: Inhalation    cetirizine (zyrTEC) tablet 10 mg 10 mg Daily 12/23/2021     Route: Oral    cholecalciferol (VITAMIN D3) tablet 1,000 Units 1,000 Units Daily 12/23/2021     Route: Oral    dexamethasone (DECADRON) injection 6 mg 6 mg Daily 12/23/2021     Admin Instructions: For IV administration.  May be pushed over a minimum of 1 minute.    Route: Intravenous    enoxaparin (LOVENOX) syringe 40 mg 40 mg Nightly 12/23/2021     Admin Instructions: Give subcutaneous in abdomen only. Do not massage site after injection.    Route: Subcutaneous    famotidine (PEPCID) tablet 20 mg 20 mg 2 Times Daily 12/23/2021     Route: Oral    guaiFENesin (MUCINEX) 12 hr tablet 600 mg 600 mg Every 12 Hours Scheduled 12/23/2021     Admin Instructions: Caution: Look alike/sound alike drug alert               Do not crush, split, or chew.    Route: Oral    sodium chloride 0.9 % flush 10 mL 10 mL As Needed 12/22/2021     Route: Intravenous    Cosign for Ordering: Accepted by Ever Ames MD on 12/22/2021  5:57 PM    zinc sulfate (ZINCATE) capsule 220 mg 220 mg Daily 12/23/2021     Route: Oral          Assessment/Plan         Acute bilateral low back pain without sciatica    Covid 19  PNA    Plan :       O2 sat 95%  On RA  supp care  decadron  Will follow  Thank  you      Suly Michelle MD  12/23/21  13:33 EST

## 2021-12-23 NOTE — ED NOTES
Nursing report ED to floor  Quin Carrington  40 y.o.  female    HPI :   Chief Complaint   Patient presents with   • Flank Pain       Admitting doctor:   Abdoul Barrios MD    Admitting diagnosis:   The primary encounter diagnosis was Acute bilateral low back pain without sciatica. Diagnoses of Fever and chills and COVID-19 were also pertinent to this visit.    Code status:   Current Code Status     Date Active Code Status Order ID Comments User Context       Not on file    Advance Care Planning Activity          Allergies:   Sulfa antibiotics    Intake and Output    Intake/Output Summary (Last 24 hours) at 12/22/2021 2136  Last data filed at 12/22/2021 1856  Gross per 24 hour   Intake 1000 ml   Output --   Net 1000 ml       Weight:       12/22/21  1741   Weight: 86.2 kg (190 lb)       Most recent vitals:   Vitals:    12/22/21 2001 12/22/21 2019 12/22/21 2101 12/22/21 2131   BP: 111/71  124/66 116/57   Pulse: 81  86 75   Resp:       Temp:  99.5 °F (37.5 °C)     TempSrc:  Oral     SpO2: 93%  98% 95%   Weight:       Height:           Active LDAs/IV Access:   Lines, Drains & Airways     Active LDAs     Name Placement date Placement time Site Days    Peripheral IV 12/22/21 1737 Left Wrist 12/22/21  1737  Wrist  less than 1                Labs (abnormal labs have a star):   Labs Reviewed   COVID-19,[facility]HOU[facility] CEPHEID/YAYA, NP SWAB IN TRANSPORT MEDIA 8-12 HR TAT - Abnormal; Notable for the following components:       Result Value    COVID19 Detected (*)     All other components within normal limits    Narrative:     Fact sheet for providers: https://www.fda.gov/media/455868/download    Fact sheet for patients: https://www.fda.gov/media/436772/download    Test performed by PCR.   COMPREHENSIVE METABOLIC PANEL - Abnormal; Notable for the following components:    Glucose 104 (*)     CO2 21.2 (*)     All other components within normal limits    Narrative:     GFR Normal >60  Chronic Kidney Disease <60  Kidney Failure <15    "  CBC WITH AUTO DIFFERENTIAL - Abnormal; Notable for the following components:    MCH 34.5 (*)     MCHC 35.8 (*)     RDW 11.7 (*)     Neutrophil % 77.5 (*)     Lymphocyte % 12.8 (*)     All other components within normal limits   LIPASE - Normal   URINALYSIS W/ MICROSCOPIC IF INDICATED (NO CULTURE) - Normal    Narrative:     Urine microscopic not indicated.   HCG, SERUM, QUALITATIVE - Normal   SEDIMENTATION RATE - Normal   C-REACTIVE PROTEIN - Normal   LACTIC ACID, PLASMA - Normal   PROCALCITONIN - Normal    Narrative:     As a Marker for Sepsis (Non-Neonates):     1. <0.5 ng/mL represents a low risk of severe sepsis and/or septic shock.  2. >2 ng/mL represents a high risk of severe sepsis and/or septic shock.    As a Marker for Lower Respiratory Tract Infections that require antibiotic therapy:  PCT on Admission     Antibiotic Therapy             6-12 Hrs later  >0.5                          Strongly Recommended            >0.25 - <0.5             Recommended  0.1 - 0.25                  Discouraged                       Remeasure/reassess PCT  <0.1                         Strongly Discouraged         Remeasure/reassess PCT      As 28 day mortality risk marker: \"Change in Procalcitonin Result\" (>80% or <=80%) if Day 0 (or Day 1) and Day 4 values are available. Refer to http://www.[employer]-pct-calculator.com/    Change in PCT <=80 %   A decrease of PCT levels below or equal to 80% defines a positive change in PCT test result representing a higher risk for 28-day all-cause mortality of patients diagnosed with severe sepsis or septic shock.    Change in PCT >80 %   A decrease of PCT levels of more than 80% defines a negative change in PCT result representing a lower risk for 28-day all-cause mortality of patients diagnosed with severe sepsis or septic shock.               COVID PRE-OP / PRE-PROCEDURE SCREENING ORDER (NO ISOLATION)    Narrative:     The following orders were created for panel order COVID PRE-OP / " PRE-PROCEDURE SCREENING ORDER (NO ISOLATION) - Swab, Nasopharynx.  Procedure                               Abnormality         Status                     ---------                               -----------         ------                     COVID-19,[facility]-HOU[facility]...[[id number]]  Abnormal            Final result                 Please view results for these tests on the individual orders.   BLOOD CULTURE   BLOOD CULTURE   RAINBOW DRAW    Narrative:     The following orders were created for panel order [place] Draw.  Procedure                               Abnormality         Status                     ---------                               -----------         ------                     Green Top (Gel)[[id number]]                                  Final result               Lavender Top[[id number]]                                     Final result               Gold Top - SST[[id number]]                                                              Light Blue Top[[id number]]                                   Final result                 Please view results for these tests on the individual orders.   CBC AND DIFFERENTIAL    Narrative:     The following orders were created for panel order CBC & Differential.  Procedure                               Abnormality         Status                     ---------                               -----------         ------                     CBC Auto Differential[[id number]]        Abnormal            Final result                 Please view results for these tests on the individual orders.   GREEN TOP   LAVENDER TOP   LIGHT BLUE TOP       EKG:   No orders to display       Meds given in ED:   Medications   sodium chloride 0.9 % flush 10 mL (has no administration in time range)   vancomycin 1750 mg/500 mL 0.9% NS IVPB ([facility]) (has no administration in time range)   piperacillin-tazobactam (ZOSYN) 3.375 g in iso-osmotic dextrose 50 ml (premix) (3.375 g Intravenous New Bag 12/22/21 2230)    sodium chloride 0.9 % bolus 1,000 mL (0 mL Intravenous Stopped 12/22/21 1856)   ketorolac (TORADOL) injection 15 mg (15 mg Intravenous Given 12/22/21 1737)   acetaminophen (TYLENOL) tablet 1,000 mg (1,000 mg Oral Given 12/22/21 1831)       Imaging results:  CT Abdomen Pelvis Without Contrast    Result Date: 12/22/2021  Unremarkable CT scan of the abdomen and pelvis. No cause for the patient's flank pain identified.  This report was finalized on 12/22/2021 7:51 PM by Dr. Sanjay Velez M.D.        Ambulatory status:   - ad magda    Social issues:   Social History     Socioeconomic History   • Marital status: Single   Tobacco Use   • Smoking status: Current Every Day Smoker       NIH Stroke Scale:        Nursing report ED to floor:     Hilaria Perez RN  12/22/21 2508

## 2021-12-23 NOTE — PLAN OF CARE
Goal Outcome Evaluation:   Patient still complaining of persistent B flank pain but also reports that she has had this same symptom 4 times in several months, each concurrent with URIs. She did have COVID in January. Aox4, VSS. Mild respiratory symptoms, but no O2 needs. Labwork, monitoring. Probably home tomorrow.

## 2021-12-23 NOTE — ED NOTES
"This RN attempted to obtain Covid swab at this time, Pt repeatedly states \"I am scared, can I just wait. Or can I go get it tomorrow from somewhere else?\"  This RN informed Pt that another RN would be back shortly to try again and obtain swab.    Patient was placed in face mask during first look triage.  Patient was wearing a face mask throughout encounter.  I wore personal protective equipment throughout the encounter.  Hand hygiene was performed before and after patient encounter.       Chasidy Gauthier, RN  12/22/21 1950    "

## 2021-12-23 NOTE — PLAN OF CARE
Goal Outcome Evaluation:  Plan of Care Reviewed With: patient        Progress: no change     Patient admitted to the unit on RA. She is independent with care. A0x4. Makes needs known. C/o body aches. Tylenol given. Reg diet. CT abd/pelvis negative with no acute findings. Urine- negative results. Blood Cx pending.

## 2021-12-23 NOTE — H&P
"History and physical    Primary care physician   Not known    Chief complaint  Low back pain  Myalgia  Fever chills    History of present illness  40-year-old white female with no medical problems no medication presented to [facility] emergency room with low back pain myalgia fever chills but denies any shortness of breath or cough.  Patient work-up in ER revealed COVID-19 infection admit for management.  Patient also denies any loss of taste or smell.  Patient did not receive COVID-19 vaccine.    PAST MEDICAL HISTORY   Unremarkable      PAST SURGICAL HISTORY              Procedure Laterality Date   • TUBAL ABDOMINAL LIGATION             FAMILY HISTORY           Problem Relation Age of Onset   • No Known Problems Father     • No Known Problems Mother        SOCIAL HISTORY                Socioeconomic History   • Marital status: Single   Tobacco Use   • Smoking status: Current Every Day Smoker         ALLERGIES  Sulfa antibiotics  Home medications reviewed     REVIEW OF SYSTEMS  All systems reviewed and negative except for those discussed in HPI.      PHYSICAL EXAM  Blood pressure 125/74, pulse 73, temperature 100.5 °F (38.1 °C), temperature source Oral, resp. rate 18, height 175.3 cm (69\"), weight 86.2 kg (190 lb), SpO2 96 %.    GENERAL: Nontoxic-appearing  HENT: nares patent  EYES: no scleral icterus  CV: Sinus tachycardia  RESPIRATORY: normal effort  ABDOMEN: soft, nontender bowel sounds present.    MUSCULOSKELETAL: no deformity.  Normal neuro exam   NEURO: alert, moves all extremities, follows commands  SKIN: warm, dry     LAB RESULTS  Lab Results (last 24 hours)     Procedure Component Value Units Date/Time    COVID PRE-OP / PRE-PROCEDURE SCREENING ORDER (NO ISOLATION) - Swab, Nasopharynx [[id number]]  (Abnormal) Collected: 12/22/21 2018    Specimen: Swab from Nasopharynx Updated: 12/22/21 2055    Narrative:      The following orders were created for panel order COVID PRE-OP / PRE-PROCEDURE " "SCREENING ORDER (NO ISOLATION) - Swab, Nasopharynx.  Procedure                               Abnormality         Status                     ---------                               -----------         ------                     COVID-19,[facility] IN-HOUSE...[[id number]]  Abnormal            Final result                 Please view results for these tests on the individual orders.    COVID-19,[facility]-HOUSE CEPHEID/YAYA NP SWAB IN TRANSPORT MEDIA 8-12 HR TAT - Swab, Nasopharynx [[id number]]  (Abnormal) Collected: 12/22/21 2018    Specimen: Swab from Nasopharynx Updated: 12/22/21 2055     COVID19 Detected    Narrative:      Fact sheet for providers: https://www.fda.gov/media/636440/download    Fact sheet for patients: https://www.fda.gov/media/570356/download    Test performed by PCR.    Procalcitonin [[id number]]  (Normal) Collected: 12/22/21 1652    Specimen: Blood Updated: 12/22/21 2044     Procalcitonin 0.07 ng/mL     Narrative:      As a Marker for Sepsis (Non-Neonates):     1. <0.5 ng/mL represents a low risk of severe sepsis and/or septic shock.  2. >2 ng/mL represents a high risk of severe sepsis and/or septic shock.    As a Marker for Lower Respiratory Tract Infections that require antibiotic therapy:  PCT on Admission     Antibiotic Therapy             6-12 Hrs later  >0.5                          Strongly Recommended            >0.25 - <0.5             Recommended  0.1 - 0.25                  Discouraged                       Remeasure/reassess PCT  <0.1                         Strongly Discouraged         Remeasure/reassess PCT      As 28 day mortality risk marker: \"Change in Procalcitonin Result\" (>80% or <=80%) if Day 0 (or Day 1) and Day 4 values are available. Refer to http://www.myTomorrowss-pct-calculator.com/    Change in PCT <=80 %   A decrease of PCT levels below or equal to 80% defines a positive change in PCT test result representing a higher risk for 28-day all-cause mortality of patients diagnosed with " severe sepsis or septic shock.    Change in PCT >80 %   A decrease of PCT levels of more than 80% defines a negative change in PCT result representing a lower risk for 28-day all-cause mortality of patients diagnosed with severe sepsis or septic shock.                Lactic Acid, Plasma [[id number]]  (Normal) Collected: 12/22/21 2017    Specimen: Blood Updated: 12/22/21 2041     Lactate 0.6 mmol/L     C-reactive Protein [[id number]]  (Normal) Collected: 12/22/21 1652    Specimen: Blood Updated: 12/22/21 2026     C-Reactive Protein 0.37 mg/dL     Blood Culture - Blood, Arm, Left [[id number]] Collected: 12/22/21 2017    Specimen: Blood from Arm, Left Updated: 12/22/21 2023    Blood Culture - Blood, Arm, Left [[id number]] Collected: 12/22/21 2017    Specimen: Blood from Arm, Left Updated: 12/22/21 2023    Sedimentation Rate [[id number]]  (Normal) Collected: 12/22/21 1652    Specimen: Blood Updated: 12/22/21 1849     Sed Rate 10 mm/hr     Causey Draw [[id number]] Collected: 12/22/21 1652    Specimen: Blood Updated: 12/22/21 1801    Narrative:      The following orders were created for panel order Causey Draw.  Procedure                               Abnormality         Status                     ---------                               -----------         ------                     Green Top (Gel)[[id number]]                                  Final result               Lavender Top[[id number]]                                     Final result               Gold Top - SST[[id number]]                                                              Light Blue Top[[id number]]                                   Final result                 Please view results for these tests on the individual orders.    Green Top (Gel) [[id number]] Collected: 12/22/21 1652    Specimen: Blood Updated: 12/22/21 1801     Extra Tube Hold for add-ons.     Comment: Auto resulted.       Lavender Top [[id number]] Collected: 12/22/21 1652    Specimen: Blood Updated:  12/22/21 1801     Extra Tube hold for add-on     Comment: Auto resulted       Light Blue Top [[id number]] Collected: 12/22/21 1652    Specimen: Blood Updated: 12/22/21 1801     Extra Tube hold for add-on     Comment: Auto resulted       Comprehensive Metabolic Panel [[id number]]  (Abnormal) Collected: 12/22/21 1652    Specimen: Blood Updated: 12/22/21 1720     Glucose 104 mg/dL      BUN 14 mg/dL      Creatinine 0.66 mg/dL      Sodium 139 mmol/L      Potassium 4.0 mmol/L      Chloride 106 mmol/L      CO2 21.2 mmol/L      Calcium 9.2 mg/dL      Total Protein 6.9 g/dL      Albumin 4.20 g/dL      ALT (SGPT) 15 U/L      AST (SGOT) 12 U/L      Alkaline Phosphatase 72 U/L      Total Bilirubin 0.3 mg/dL      eGFR Non African Amer 98 mL/min/1.73      Globulin 2.7 gm/dL      A/G Ratio 1.6 g/dL      BUN/Creatinine Ratio 21.2     Anion Gap 11.8 mmol/L     Narrative:      GFR Normal >60  Chronic Kidney Disease <60  Kidney Failure <15      Lipase [[id number]]  (Normal) Collected: 12/22/21 1652    Specimen: Blood Updated: 12/22/21 1720     Lipase 26 U/L     hCG, Serum, Qualitative [[id number]]  (Normal) Collected: 12/22/21 1652    Specimen: Blood Updated: 12/22/21 1712     HCG Qualitative Negative    Urinalysis With Microscopic If Indicated (No Culture) - Urine, Clean Catch [[id number]]  (Normal) Collected: 12/22/21 1652    Specimen: Urine, Clean Catch Updated: 12/22/21 1707     Color, UA Yellow     Appearance, UA Clear     pH, UA 8.0     Specific Gravity, UA 1.021     Glucose, UA Negative     Ketones, UA Negative     Bilirubin, UA Negative     Blood, UA Negative     Protein, UA Negative     Leuk Esterase, UA Negative     Nitrite, UA Negative     Urobilinogen, UA 1.0 E.U./dL    Narrative:      Urine microscopic not indicated.    CBC & Differential [[id number]]  (Abnormal) Collected: 12/22/21 1652    Specimen: Blood Updated: 12/22/21 1704    Narrative:      The following orders were created for panel order CBC & Differential.  Procedure                                Abnormality         Status                     ---------                               -----------         ------                     CBC Auto Differential[[id number]]        Abnormal            Final result                 Please view results for these tests on the individual orders.    CBC Auto Differential [[id number]]  (Abnormal) Collected: 12/22/21 1652    Specimen: Blood Updated: 12/22/21 1704     WBC 5.47 10*3/mm3      RBC 3.94 10*6/mm3      Hemoglobin 13.6 g/dL      Hematocrit 38.0 %      MCV 96.4 fL      MCH 34.5 pg      MCHC 35.8 g/dL      RDW 11.7 %      RDW-SD 41.0 fl      MPV 10.7 fL      Platelets 190 10*3/mm3      Neutrophil % 77.5 %      Lymphocyte % 12.8 %      Monocyte % 7.5 %      Eosinophil % 1.3 %      Basophil % 0.4 %      Immature Grans % 0.5 %      Neutrophils, Absolute 4.24 10*3/mm3      Lymphocytes, Absolute 0.70 10*3/mm3      Monocytes, Absolute 0.41 10*3/mm3      Eosinophils, Absolute 0.07 10*3/mm3      Basophils, Absolute 0.02 10*3/mm3      Immature Grans, Absolute 0.03 10*3/mm3      nRBC 0.0 /100 WBC         Imaging Results (Last 24 Hours)     Procedure Component Value Units Date/Time    CT Abdomen Pelvis Without Contrast [[id number]] Collected: 12/22/21 1947     Updated: 12/22/21 1954    Narrative:      CT ABDOMEN PELVIS WO CONTRAST-     CLINICAL HISTORY: Bilateral flank pain. Fever.     TECHNIQUE: Spiral CT images were acquired through the abdomen and pelvis  with no oral or IV contrast and were reconstructed in 3 mm thick slices.     Radiation dose reduction techniques were utilized, including automated  exposure control and exposure modulation based on body size.     COMPARISON: None     FINDINGS: Both kidneys are normal in size and shape and show no  hydronephrosis. No radiopaque calculi are present within either renal  collecting system or in the urinary bladder. No cystic or solid masses  are identified. The liver and spleen and pancreas and adrenal  glands  appear within normal limits. The stomach and small and large bowel are  unremarkable. There is no bowel distention. The uterus and ovaries are  unremarkable except for the presence of bilateral tubal ligation clips.  No abnormal masses or fluid collections are identified in the abdomen or  pelvis. There are no hernias.       Impression:      Unremarkable CT scan of the abdomen and pelvis. No cause for  the patient's flank pain identified.     This report was finalized on 12/22/2021 7:51 PM by Dr. Sanjay Velez M.D.             Current Facility-Administered Medications:   •  acetaminophen (TYLENOL) tablet 650 mg, 650 mg, Oral, Q6H PRN, Samina Valencia MD, 650 mg at 12/23/21 0840  •  dexamethasone (DECADRON) injection 6 mg, 6 mg, Intravenous, Daily, Samina Valencia MD, 6 mg at 12/23/21 0841  •  enoxaparin (LOVENOX) syringe 40 mg, 40 mg, Subcutaneous, Nightly, Samina Valencia MD  •  nitroglycerin (NITROSTAT) SL tablet 0.4 mg, 0.4 mg, Sublingual, Q5 Min PRN, Samina Valencia MD  •  sodium chloride 0.9 % flush 10 mL, 10 mL, Intravenous, PRN, Ever Ames MD     ASSESSMENT  COVID-19 infection  Low back pain  Osteoarthritis  Degenerative disease  Tobacco use  Gastroesophageal reflux disease     PLAN  Admit  Supplement oxygen as needed  Decadron  Symptomatic treatment for fever myalgia  Hold onto remdesivir  Infectious consult  Stress ulcer DVT prophylaxis  Supportive care  Patient is full code  Discussed with nursing staff  Follow closely and further recommendation according to hospital course    SAMINA VALENCIA MD

## 2021-12-23 NOTE — CASE MANAGEMENT/SOCIAL WORK
Discharge Planning Assessment  [facility]     Patient Name: Quin Carrington  MRN: [id number]  Today's Date: 12/23/2021    Admit Date: 12/22/2021     Discharge Needs Assessment     Row Name 12/23/21 1416       Living Environment    Lives With friend(s)    Current Living Arrangements home/apartment/condo    Primary Care Provided by self    Provides Primary Care For no one    Family Caregiver if Needed child(filippo), adult; grandparent(s); friend(s); parent(s)    Quality of Family Relationships helpful; involved; supportive    Able to Return to Prior Arrangements yes       Resource/Environmental Concerns    Resource/Environmental Concerns home accessibility    Home Accessibility Concerns stairs to enter home; not wheelchair accessible    Transportation Concerns car, none       Transition Planning    Patient/Family Anticipates Transition to home    Patient/Family Anticipated Services at Transition other (see comments)    Transportation Anticipated car, drives self; family or friend will provide       Discharge Needs Assessment    Readmission Within the Last 30 Days no previous admission in last 30 days    Equipment Currently Used at Home none    Concerns to be Addressed discharge planning; adjustment to diagnosis/illness; decision-making    Concerns Comments new pcp appointment    Anticipated Changes Related to Illness none    Equipment Needed After Discharge other (see comments)               Discharge Plan     Row Name 12/23/21 1416       Plan    Plan Home denies any dc needs    Provided Post Acute Provider List? N/A    Patient/Family in Agreement with Plan yes    Plan Comments CCP called into pts room due to isolation, introduced self and explained CCP role. Verified facesheet and confirmed phone# [phone]. [facility]. Pt denies problems with medication costs. Offered meds to beds and pt wishes to enroll. Added to [employer]. Pt denies PCP but agreeable for CCP to make a referral to [facility] to arrange new pt appointment. She prefers mornings as she works at 3pm. Pt lives on the 3rd floor of an apartment building (no elevator access). Pt denies problems with stairs prior to admission. Pt denies any DME, HH or snf hx. Pt states plan is home and denies any concerns at this time. If DME needed at dc, pt denies preference. CCP explained will follow for dc planning needs. CCP called [facility] referral line [phone] and New PCP appointment arranged for Monday Daniele 3, 2022 at 1pm. Added to [employer]. Ivis MONTOYA/JOSE ELIAS              Continued Care and Services - Admitted Since 12/22/2021    Coordination has not been started for this encounter.          Demographic Summary     Row Name 12/23/21 1416       General Information    Admission Type inpatient    Referral Source admission list    Reason for Consult discharge planning; decision-making    Preferred Language English     Used During This Interaction no       Contact Information    Permission Granted to Share Info With family/designee; facility                Functional Status     Row Name 12/23/21 1417       Functional Status    Usual Activity Tolerance good    Current Activity Tolerance moderate       Functional Status, IADL    Medications independent    Meal Preparation independent    Housekeeping independent    Laundry independent    Shopping independent       Mental Status Summary    Recent Changes in Mental Status/Cognitive Functioning no changes               Psychosocial    No documentation.                Abuse/Neglect    No documentation.                Legal    No documentation.                Substance Abuse    No documentation.                Patient Forms    No documentation.                   Geno May RN

## 2021-12-24 ENCOUNTER — READMISSION MANAGEMENT (OUTPATIENT)
Dept: CALL CENTER | Facility: HOSPITAL | Age: 41
End: 2021-12-24

## 2021-12-24 VITALS
SYSTOLIC BLOOD PRESSURE: 104 MMHG | HEIGHT: 69 IN | RESPIRATION RATE: 16 BRPM | HEART RATE: 48 BPM | WEIGHT: 190 LBS | BODY MASS INDEX: 28.14 KG/M2 | OXYGEN SATURATION: 96 % | DIASTOLIC BLOOD PRESSURE: 68 MMHG | TEMPERATURE: 98.6 F

## 2021-12-24 LAB
ALBUMIN SERPL-MCNC: 3.8 G/DL (ref 3.5–5.2)
ALBUMIN/GLOB SERPL: 1.5 G/DL
ALP SERPL-CCNC: 62 U/L (ref 39–117)
ALT SERPL W P-5'-P-CCNC: 14 U/L (ref 1–33)
ANION GAP SERPL CALCULATED.3IONS-SCNC: 8.7 MMOL/L (ref 5–15)
AST SERPL-CCNC: 12 U/L (ref 1–32)
BASOPHILS # BLD AUTO: 0 10*3/MM3 (ref 0–0.2)
BASOPHILS NFR BLD AUTO: 0 % (ref 0–1.5)
BILIRUB SERPL-MCNC: <0.2 MG/DL (ref 0–1.2)
BUN SERPL-MCNC: 11 MG/DL (ref 6–20)
BUN/CREAT SERPL: 15.5 (ref 7–25)
CALCIUM SPEC-SCNC: 9.1 MG/DL (ref 8.6–10.5)
CHLORIDE SERPL-SCNC: 108 MMOL/L (ref 98–107)
CHOLEST SERPL-MCNC: 129 MG/DL (ref 0–200)
CO2 SERPL-SCNC: 25.3 MMOL/L (ref 22–29)
CREAT SERPL-MCNC: 0.71 MG/DL (ref 0.57–1)
CRP SERPL-MCNC: 0.56 MG/DL (ref 0–0.5)
D DIMER PPP FEU-MCNC: 0.29 MCGFEU/ML (ref 0–0.49)
DEPRECATED RDW RBC AUTO: 42.9 FL (ref 37–54)
EOSINOPHIL # BLD AUTO: 0 10*3/MM3 (ref 0–0.4)
EOSINOPHIL NFR BLD AUTO: 0 % (ref 0.3–6.2)
ERYTHROCYTE [DISTWIDTH] IN BLOOD BY AUTOMATED COUNT: 11.9 % (ref 12.3–15.4)
FERRITIN SERPL-MCNC: 102 NG/ML (ref 13–150)
GFR SERPL CREATININE-BSD FRML MDRD: 90 ML/MIN/1.73
GLOBULIN UR ELPH-MCNC: 2.6 GM/DL
GLUCOSE SERPL-MCNC: 111 MG/DL (ref 65–99)
HBA1C MFR BLD: 5.3 % (ref 4.8–5.6)
HCT VFR BLD AUTO: 37.2 % (ref 34–46.6)
HDLC SERPL-MCNC: 46 MG/DL (ref 40–60)
HGB BLD-MCNC: 12.9 G/DL (ref 12–15.9)
IMM GRANULOCYTES # BLD AUTO: 0.01 10*3/MM3 (ref 0–0.05)
IMM GRANULOCYTES NFR BLD AUTO: 0.2 % (ref 0–0.5)
LDLC SERPL CALC-MCNC: 66 MG/DL (ref 0–100)
LDLC/HDLC SERPL: 1.41 {RATIO}
LYMPHOCYTES # BLD AUTO: 2.07 10*3/MM3 (ref 0.7–3.1)
LYMPHOCYTES NFR BLD AUTO: 48.5 % (ref 19.6–45.3)
MCH RBC QN AUTO: 34.1 PG (ref 26.6–33)
MCHC RBC AUTO-ENTMCNC: 34.7 G/DL (ref 31.5–35.7)
MCV RBC AUTO: 98.4 FL (ref 79–97)
MONOCYTES # BLD AUTO: 0.4 10*3/MM3 (ref 0.1–0.9)
MONOCYTES NFR BLD AUTO: 9.4 % (ref 5–12)
NEUTROPHILS NFR BLD AUTO: 1.79 10*3/MM3 (ref 1.7–7)
NEUTROPHILS NFR BLD AUTO: 41.9 % (ref 42.7–76)
NRBC BLD AUTO-RTO: 0 /100 WBC (ref 0–0.2)
NT-PROBNP SERPL-MCNC: 265.2 PG/ML (ref 0–450)
PLATELET # BLD AUTO: 180 10*3/MM3 (ref 140–450)
PMV BLD AUTO: 11.4 FL (ref 6–12)
POTASSIUM SERPL-SCNC: 3.9 MMOL/L (ref 3.5–5.2)
PROT SERPL-MCNC: 6.4 G/DL (ref 6–8.5)
RBC # BLD AUTO: 3.78 10*6/MM3 (ref 3.77–5.28)
SODIUM SERPL-SCNC: 142 MMOL/L (ref 136–145)
TRIGL SERPL-MCNC: 90 MG/DL (ref 0–150)
TSH SERPL DL<=0.05 MIU/L-ACNC: 0.26 UIU/ML (ref 0.27–4.2)
VLDLC SERPL-MCNC: 17 MG/DL (ref 5–40)
WBC NRBC COR # BLD: 4.27 10*3/MM3 (ref 3.4–10.8)

## 2021-12-24 PROCEDURE — 80053 COMPREHEN METABOLIC PANEL: CPT | Performed by: HOSPITALIST

## 2021-12-24 PROCEDURE — 80061 LIPID PANEL: CPT | Performed by: HOSPITALIST

## 2021-12-24 PROCEDURE — 94799 UNLISTED PULMONARY SVC/PX: CPT

## 2021-12-24 PROCEDURE — 85379 FIBRIN DEGRADATION QUANT: CPT | Performed by: HOSPITALIST

## 2021-12-24 PROCEDURE — 83036 HEMOGLOBIN GLYCOSYLATED A1C: CPT | Performed by: HOSPITALIST

## 2021-12-24 PROCEDURE — 86140 C-REACTIVE PROTEIN: CPT | Performed by: HOSPITALIST

## 2021-12-24 PROCEDURE — G0378 HOSPITAL OBSERVATION PER HR: HCPCS

## 2021-12-24 PROCEDURE — 83880 ASSAY OF NATRIURETIC PEPTIDE: CPT | Performed by: HOSPITALIST

## 2021-12-24 PROCEDURE — 94664 DEMO&/EVAL PT USE INHALER: CPT

## 2021-12-24 PROCEDURE — 84443 ASSAY THYROID STIM HORMONE: CPT | Performed by: HOSPITALIST

## 2021-12-24 PROCEDURE — 82728 ASSAY OF FERRITIN: CPT | Performed by: HOSPITALIST

## 2021-12-24 PROCEDURE — 85025 COMPLETE CBC W/AUTO DIFF WBC: CPT | Performed by: HOSPITALIST

## 2021-12-24 RX ORDER — DEXAMETHASONE 6 MG/1
6 TABLET ORAL
Status: DISCONTINUED | OUTPATIENT
Start: 2021-12-24 | End: 2021-12-24 | Stop reason: HOSPADM

## 2021-12-24 RX ORDER — FAMOTIDINE 20 MG/1
20 TABLET, FILM COATED ORAL 2 TIMES DAILY
Qty: 60 TABLET | Refills: 0 | Status: SHIPPED | OUTPATIENT
Start: 2021-12-24 | End: 2022-01-23

## 2021-12-24 RX ORDER — MELATONIN
1000 DAILY
Qty: 30 TABLET | Refills: 0 | Status: SHIPPED | OUTPATIENT
Start: 2021-12-25 | End: 2022-01-24

## 2021-12-24 RX ORDER — GUAIFENESIN 600 MG/1
600 TABLET, EXTENDED RELEASE ORAL EVERY 12 HOURS SCHEDULED
Qty: 60 TABLET | Refills: 0 | Status: SHIPPED | OUTPATIENT
Start: 2021-12-24 | End: 2022-01-23

## 2021-12-24 RX ORDER — DEXAMETHASONE 6 MG/1
6 TABLET ORAL
Qty: 7 TABLET | Refills: 0 | Status: SHIPPED | OUTPATIENT
Start: 2021-12-25 | End: 2022-01-01

## 2021-12-24 RX ADMIN — DEXAMETHASONE 6 MG: 6 TABLET ORAL at 14:56

## 2021-12-24 RX ADMIN — GUAIFENESIN 600 MG: 600 TABLET, EXTENDED RELEASE ORAL at 10:13

## 2021-12-24 RX ADMIN — BUDESONIDE AND FORMOTEROL FUMARATE DIHYDRATE 2 PUFF: 160; 4.5 AEROSOL RESPIRATORY (INHALATION) at 09:34

## 2021-12-24 RX ADMIN — Medication 220 MG: at 10:13

## 2021-12-24 RX ADMIN — Medication 1000 UNITS: at 10:13

## 2021-12-24 RX ADMIN — CETIRIZINE HYDROCHLORIDE 10 MG: 10 TABLET ORAL at 10:13

## 2021-12-24 RX ADMIN — OXYCODONE HYDROCHLORIDE AND ACETAMINOPHEN 500 MG: 500 TABLET ORAL at 10:13

## 2021-12-24 NOTE — PLAN OF CARE
Goal Outcome Evaluation:  Plan of Care Reviewed With: patient        Progress: no change  Outcome Summary: Patients vitals stable. Patient denies pain and discomfort. Patient asleep most of shift with no complaints. Will continue to monitor.

## 2021-12-24 NOTE — PROGRESS NOTES
LOS: 1 day   Patient Care Team:  Provider, No Known as PCP - General        Subjective     Interval History:     Patient Complaints:   Patient Denies:  NV     Review of Systems:    better    Objective     Vital Signs  Temp:  [98 °F (36.7 °C)-98.6 °F (37 °C)] 98.6 °F (37 °C)  Heart Rate:  [41-59] 48  Resp:  [16] 16  BP: (101-128)/(55-83) 104/68    Physical Exam:     General Appearance:    Alert, cooperative, in no acute distress   Head:    Normocephalic, without obvious abnormality, atraumatic   Eyes:            Lids and lashes normal, conjunctivae and sclerae normal, no   icterus, no pallor, corneas clear, PERRLA   Ears:    Ears appear intact with no abnormalities noted   Throat:   No oral lesions, no thrush, oral mucosa moist   Neck:   No adenopathy, supple, trachea midline, no thyromegaly, no     carotid bruit, no JVD   Back:     No kyphosis present, no scoliosis present, no skin lesions,       erythema or scars, no tenderness to percussion or                   palpation,   range of motion normal   Lungs:     Clear to auscultation,respirations regular, even and                   unlabored    Heart:    Regular rhythm and normal rate, normal S1 and S2, no            murmur, no gallop, no rub, no click   Breast Exam:    Deferred   Abdomen:     Normal bowel sounds, no masses, no organomegaly, soft        non-tender, non-distended, no guarding, no rebound                 tenderness   Genitalia:    Deferred   Extremities:   Moves all extremities well, no edema, no cyanosis, no              redness   Pulses:   Pulses palpable and equal bilaterally   Skin:   No bleeding, bruising or rash   Lymph nodes:   No palpable adenopathy   Neurologic:   Cranial nerves 2 - 12 grossly intact, sensation intact, DTR        present and equal bilaterally          Results Review:      Lab Results (last 72 hours)     Procedure Component Value Units Date/Time    TSH [[id number]]  (Abnormal) Collected: 12/24/21 0651    Specimen: Blood  Updated: 12/24/21 0830     TSH 0.260 uIU/mL     BNP [[id number]]  (Normal) Collected: 12/24/21 0651    Specimen: Blood Updated: 12/24/21 0830     proBNP 265.2 pg/mL     Narrative:      Among patients with dyspnea, NT-proBNP is highly sensitive for the detection of acute congestive heart failure. In addition NT-proBNP of <300 pg/ml effectively rules out acute congestive heart failure with 99% negative predictive value.    Results may be falsely decreased if patient taking Biotin.      Ferritin [[id number]]  (Normal) Collected: 12/24/21 0651    Specimen: Blood Updated: 12/24/21 0830     Ferritin 102.00 ng/mL     Narrative:      Results may be falsely decreased if patient taking Biotin.      Comprehensive Metabolic Panel [[id number]]  (Abnormal) Collected: 12/24/21 0651    Specimen: Blood Updated: 12/24/21 0826     Glucose 111 mg/dL      BUN 11 mg/dL      Creatinine 0.71 mg/dL      Sodium 142 mmol/L      Potassium 3.9 mmol/L      Chloride 108 mmol/L      CO2 25.3 mmol/L      Calcium 9.1 mg/dL      Total Protein 6.4 g/dL      Albumin 3.80 g/dL      ALT (SGPT) 14 U/L      AST (SGOT) 12 U/L      Alkaline Phosphatase 62 U/L      Total Bilirubin <0.2 mg/dL      eGFR Non African Amer 90 mL/min/1.73      Globulin 2.6 gm/dL      A/G Ratio 1.5 g/dL      BUN/Creatinine Ratio 15.5     Anion Gap 8.7 mmol/L     Narrative:      GFR Normal >60  Chronic Kidney Disease <60  Kidney Failure <15      C-reactive Protein [[id number]]  (Abnormal) Collected: 12/24/21 0651    Specimen: Blood Updated: 12/24/21 0826     C-Reactive Protein 0.56 mg/dL     Lipid Panel [[id number]] Collected: 12/24/21 0651    Specimen: Blood Updated: 12/24/21 0826     Total Cholesterol 129 mg/dL      Triglycerides 90 mg/dL      HDL Cholesterol 46 mg/dL      LDL Cholesterol  66 mg/dL      VLDL Cholesterol 17 mg/dL      LDL/HDL Ratio 1.41    Narrative:      Cholesterol Reference Ranges  (U.S. Department of Health and Human Services ATP III Classifications)    Desirable           <200 mg/dL  Borderline High    200-239 mg/dL  High Risk          >240 mg/dL      Triglyceride Reference Ranges  (U.S. Department of Health and Human Services ATP III Classifications)    Normal           <150 mg/dL  Borderline High  150-199 mg/dL  High             200-499 mg/dL  Very High        >500 mg/dL    HDL Reference Ranges  (U.S. Department of Health and Human Services ATP III Classifcations)    Low     <40 mg/dl (major risk factor for CHD)  High    >60 mg/dl ('negative' risk factor for CHD)        LDL Reference Ranges  (U.S. Department of Health and Human Services ATP III Classifcations)    Optimal          <100 mg/dL  Near Optimal     100-129 mg/dL  Borderline High  130-159 mg/dL  High             160-189 mg/dL  Very High        >189 mg/dL    Hemoglobin A1c [[id number]]  (Normal) Collected: 12/24/21 0651    Specimen: Blood Updated: 12/24/21 0806     Hemoglobin A1C 5.30 %     Narrative:      Hemoglobin A1C Ranges:    Increased Risk for Diabetes  5.7% to 6.4%  Diabetes                     >= 6.5%  Diabetic Goal                < 7.0%    D-dimer, Quantitative [[id number]]  (Normal) Collected: 12/24/21 0651    Specimen: Blood Updated: 12/24/21 0806     D-Dimer, Quantitative 0.29 MCGFEU/mL     Narrative:      The [employer] D-Dimer test used in conjunction with a clinical pretest probability (PTP) assessment model, has been approved by the FDA to rule out the presence of venous thromboembolism (VTE) in outpatients suspected of deep venous thrombosis (DVT) or pulmonary embolism (PE). The cut-off for negative predictive value is <0.50 MCGFEU/mL.    CBC & Differential [[id number]]  (Abnormal) Collected: 12/24/21 0651    Specimen: Blood Updated: 12/24/21 0749    Narrative:      The following orders were created for panel order CBC & Differential.  Procedure                               Abnormality         Status                     ---------                               -----------         ------                      CBC Auto Differential[[id number]]        Abnormal            Final result                 Please view results for these tests on the individual orders.    CBC Auto Differential [[id number]]  (Abnormal) Collected: 12/24/21 0651    Specimen: Blood Updated: 12/24/21 0749     WBC 4.27 10*3/mm3      RBC 3.78 10*6/mm3      Hemoglobin 12.9 g/dL      Hematocrit 37.2 %      MCV 98.4 fL      MCH 34.1 pg      MCHC 34.7 g/dL      RDW 11.9 %      RDW-SD 42.9 fl      MPV 11.4 fL      Platelets 180 10*3/mm3      Neutrophil % 41.9 %      Lymphocyte % 48.5 %      Monocyte % 9.4 %      Eosinophil % 0.0 %      Basophil % 0.0 %      Immature Grans % 0.2 %      Neutrophils, Absolute 1.79 10*3/mm3      Lymphocytes, Absolute 2.07 10*3/mm3      Monocytes, Absolute 0.40 10*3/mm3      Eosinophils, Absolute 0.00 10*3/mm3      Basophils, Absolute 0.00 10*3/mm3      Immature Grans, Absolute 0.01 10*3/mm3      nRBC 0.0 /100 WBC     Blood Culture - Blood, Arm, Left [[id number]]  (Normal) Collected: 12/22/21 2017    Specimen: Blood from Arm, Left Updated: 12/23/21 2030     Blood Culture No growth at 24 hours    Blood Culture - Blood, Arm, Left [[id number]]  (Normal) Collected: 12/22/21 2017    Specimen: Blood from Arm, Left Updated: 12/23/21 2030     Blood Culture No growth at 24 hours    COVID PRE-OP / PRE-PROCEDURE SCREENING ORDER (NO ISOLATION) - Swab, Nasopharynx [[id number]]  (Abnormal) Collected: 12/22/21 2018    Specimen: Swab from Nasopharynx Updated: 12/22/21 2055    Narrative:      The following orders were created for panel order COVID PRE-OP / PRE-PROCEDURE SCREENING ORDER (NO ISOLATION) - Swab, Nasopharynx.  Procedure                               Abnormality         Status                     ---------                               -----------         ------                     [facility][[id number]]  Abnormal            Final result                 Please view results for these tests on the individual orders.     "COVID-19,[facility] IN-HOUSE CEPHEID/YAYA NP SWAB IN TRANSPORT MEDIA 8-12 HR TAT - Swab, Nasopharynx [[id number]]  (Abnormal) Collected: 12/22/21 2018    Specimen: Swab from Nasopharynx Updated: 12/22/21 2055     COVID19 Detected    Narrative:      Fact sheet for providers: https://www.fda.gov/media/176789/download    Fact sheet for patients: https://www.fda.gov/media/255350/download    Test performed by PCR.    Procalcitonin [[id number]]  (Normal) Collected: 12/22/21 1652    Specimen: Blood Updated: 12/22/21 2044     Procalcitonin 0.07 ng/mL     Narrative:      As a Marker for Sepsis (Non-Neonates):     1. <0.5 ng/mL represents a low risk of severe sepsis and/or septic shock.  2. >2 ng/mL represents a high risk of severe sepsis and/or septic shock.    As a Marker for Lower Respiratory Tract Infections that require antibiotic therapy:  PCT on Admission     Antibiotic Therapy             6-12 Hrs later  >0.5                          Strongly Recommended            >0.25 - <0.5             Recommended  0.1 - 0.25                  Discouraged                       Remeasure/reassess PCT  <0.1                         Strongly Discouraged         Remeasure/reassess PCT      As 28 day mortality risk marker: \"Change in Procalcitonin Result\" (>80% or <=80%) if Day 0 (or Day 1) and Day 4 values are available. Refer to http://www.[employer]-pct-calculator.com/    Change in PCT <=80 %   A decrease of PCT levels below or equal to 80% defines a positive change in PCT test result representing a higher risk for 28-day all-cause mortality of patients diagnosed with severe sepsis or septic shock.    Change in PCT >80 %   A decrease of PCT levels of more than 80% defines a negative change in PCT result representing a lower risk for 28-day all-cause mortality of patients diagnosed with severe sepsis or septic shock.                Lactic Acid, Plasma [[id number]]  (Normal) Collected: 12/22/21 2017    Specimen: Blood Updated: 12/22/21 2041     " Lactate 0.6 mmol/L     C-reactive Protein [[id number]]  (Normal) Collected: 12/22/21 1652    Specimen: Blood Updated: 12/22/21 2026     C-Reactive Protein 0.37 mg/dL     Sedimentation Rate [[id number]]  (Normal) Collected: 12/22/21 1652    Specimen: Blood Updated: 12/22/21 1849     Sed Rate 10 mm/hr     Portal Draw [[id number]] Collected: 12/22/21 1652    Specimen: Blood Updated: 12/22/21 1801    Narrative:      The following orders were created for panel order Portal Draw.  Procedure                               Abnormality         Status                     ---------                               -----------         ------                     Green Top (Gel)[[id number]]                                  Final result               Lavender Top[[id number]]                                     Final result               Gold Top - SST[[id number]]                                                              Light Blue Top[[id number]]                                   Final result                 Please view results for these tests on the individual orders.    Green Top (Gel) [[id number]] Collected: 12/22/21 1652    Specimen: Blood Updated: 12/22/21 1801     Extra Tube Hold for add-ons.     Comment: Auto resulted.       Lavender Top [[id number]] Collected: 12/22/21 1652    Specimen: Blood Updated: 12/22/21 1801     Extra Tube hold for add-on     Comment: Auto resulted       Light Blue Top [[id number]] Collected: 12/22/21 1652    Specimen: Blood Updated: 12/22/21 1801     Extra Tube hold for add-on     Comment: Auto resulted       Comprehensive Metabolic Panel [[id number]]  (Abnormal) Collected: 12/22/21 1652    Specimen: Blood Updated: 12/22/21 1720     Glucose 104 mg/dL      BUN 14 mg/dL      Creatinine 0.66 mg/dL      Sodium 139 mmol/L      Potassium 4.0 mmol/L      Chloride 106 mmol/L      CO2 21.2 mmol/L      Calcium 9.2 mg/dL      Total Protein 6.9 g/dL      Albumin 4.20 g/dL      ALT (SGPT) 15 U/L      AST (SGOT) 12 U/L       Alkaline Phosphatase 72 U/L      Total Bilirubin 0.3 mg/dL      eGFR Non African Amer 98 mL/min/1.73      Globulin 2.7 gm/dL      A/G Ratio 1.6 g/dL      BUN/Creatinine Ratio 21.2     Anion Gap 11.8 mmol/L     Narrative:      GFR Normal >60  Chronic Kidney Disease <60  Kidney Failure <15      Lipase [[id number]]  (Normal) Collected: 12/22/21 1652    Specimen: Blood Updated: 12/22/21 1720     Lipase 26 U/L     hCG, Serum, Qualitative [[id number]]  (Normal) Collected: 12/22/21 1652    Specimen: Blood Updated: 12/22/21 1712     HCG Qualitative Negative    Urinalysis With Microscopic If Indicated (No Culture) - Urine, Clean Catch [[id number]]  (Normal) Collected: 12/22/21 1652    Specimen: Urine, Clean Catch Updated: 12/22/21 1707     Color, UA Yellow     Appearance, UA Clear     pH, UA 8.0     Specific Gravity, UA 1.021     Glucose, UA Negative     Ketones, UA Negative     Bilirubin, UA Negative     Blood, UA Negative     Protein, UA Negative     Leuk Esterase, UA Negative     Nitrite, UA Negative     Urobilinogen, UA 1.0 E.U./dL    Narrative:      Urine microscopic not indicated.    CBC & Differential [[id number]]  (Abnormal) Collected: 12/22/21 1652    Specimen: Blood Updated: 12/22/21 1704    Narrative:      The following orders were created for panel order CBC & Differential.  Procedure                               Abnormality         Status                     ---------                               -----------         ------                     CBC Auto Differential[[id number]]        Abnormal            Final result                 Please view results for these tests on the individual orders.    CBC Auto Differential [[id number]]  (Abnormal) Collected: 12/22/21 1652    Specimen: Blood Updated: 12/22/21 1704     WBC 5.47 10*3/mm3      RBC 3.94 10*6/mm3      Hemoglobin 13.6 g/dL      Hematocrit 38.0 %      MCV 96.4 fL      MCH 34.5 pg      MCHC 35.8 g/dL      RDW 11.7 %      RDW-SD 41.0 fl      MPV 10.7 fL       Platelets 190 10*3/mm3      Neutrophil % 77.5 %      Lymphocyte % 12.8 %      Monocyte % 7.5 %      Eosinophil % 1.3 %      Basophil % 0.4 %      Immature Grans % 0.5 %      Neutrophils, Absolute 4.24 10*3/mm3      Lymphocytes, Absolute 0.70 10*3/mm3      Monocytes, Absolute 0.41 10*3/mm3      Eosinophils, Absolute 0.07 10*3/mm3      Basophils, Absolute 0.02 10*3/mm3      Immature Grans, Absolute 0.03 10*3/mm3      nRBC 0.0 /100 WBC           Imaging Results (Last 72 Hours)     Procedure Component Value Units Date/Time    XR Chest 1 View [[id number]] Collected: 12/23/21 1358     Updated: 12/23/21 1411    Narrative:      ONE VIEW PORTABLE CHEST     HISTORY: Shortness of breath.     FINDINGS: The lungs are well-expanded and clear and the heart and hilar  structures appear normal. There is no acute disease.     This report was finalized on 12/23/2021 2:08 PM by Dr. Dat Marley M.D.       CT Abdomen Pelvis Without Contrast [[id number]] Collected: 12/22/21 1947     Updated: 12/22/21 1954    Narrative:      CT ABDOMEN PELVIS WO CONTRAST-     CLINICAL HISTORY: Bilateral flank pain. Fever.     TECHNIQUE: Spiral CT images were acquired through the abdomen and pelvis  with no oral or IV contrast and were reconstructed in 3 mm thick slices.     Radiation dose reduction techniques were utilized, including automated  exposure control and exposure modulation based on body size.     COMPARISON: None     FINDINGS: Both kidneys are normal in size and shape and show no  hydronephrosis. No radiopaque calculi are present within either renal  collecting system or in the urinary bladder. No cystic or solid masses  are identified. The liver and spleen and pancreas and adrenal glands  appear within normal limits. The stomach and small and large bowel are  unremarkable. There is no bowel distention. The uterus and ovaries are  unremarkable except for the presence of bilateral tubal ligation clips.  No abnormal masses or fluid collections  are identified in the abdomen or  pelvis. There are no hernias.       Impression:      Unremarkable CT scan of the abdomen and pelvis. No cause for  the patient's flank pain identified.     This report was finalized on 12/22/2021 7:51 PM by Dr. Sanjay Velez M.D.               Medication Review:     Hospital Medications (active)       Dose Frequency Start End    acetaminophen (TYLENOL) tablet 650 mg 650 mg Every 6 Hours PRN 12/23/2021     Admin Instructions: Do not exceed 4 grams of acetaminophen in a 24 hr period. Max dose of 2gm for AST/ALT greater than 120 units/L      If given for pain, use the following pain scale:   Mild Pain = Pain Score of 1-3, CPOT 1-2  Moderate Pain = Pain Score of 4-6, CPOT 3-4  Severe Pain = Pain Score of 7-10, CPOT 5-8    Route: Oral    albuterol (PROVENTIL) nebulizer solution 0.083% 2.5 mg/3mL 2.5 mg Every 6 Hours PRN 12/23/2021     Route: Nebulization    ascorbic acid (VITAMIN C) tablet 500 mg 500 mg Daily 12/23/2021     Route: Oral    budesonide-formoterol (SYMBICORT) 160-4.5 MCG/ACT inhaler 2 puff 2 puff 2 Times Daily - RT 12/23/2021     Admin Instructions: aris well.  Rinse mouth after use, do not swallow water.  Send aerosols to pharmacy in ziplock bag for proper disposal.    Route: Inhalation    cetirizine (zyrTEC) tablet 10 mg 10 mg Daily 12/23/2021     Route: Oral    cholecalciferol (VITAMIN D3) tablet 1,000 Units 1,000 Units Daily 12/23/2021     Route: Oral    dexamethasone (DECADRON) tablet 6 mg 6 mg Daily With Breakfast 12/24/2021     Admin Instructions: Changed to PO per policy  Take with food.    Route: Oral    enoxaparin (LOVENOX) syringe 40 mg 40 mg Nightly 12/23/2021     Admin Instructions: Give subcutaneous in abdomen only. Do not massage site after injection.    Route: Subcutaneous    famotidine (PEPCID) tablet 20 mg 20 mg 2 Times Daily 12/23/2021     Route: Oral    guaiFENesin (MUCINEX) 12 hr tablet 600 mg 600 mg Every 12 Hours Scheduled 12/23/2021     Admin  Instructions: Caution: Look alike/sound alike drug alert               Do not crush, split, or chew.    Route: Oral    sodium chloride 0.9 % flush 10 mL 10 mL As Needed 12/22/2021     Route: Intravenous    Cosign for Ordering: Accepted by Ever Ames MD on 12/22/2021  5:57 PM    zinc sulfate (ZINCATE) capsule 220 mg 220 mg Daily 12/23/2021     Route: Oral        vitamin C, 500 mg, Oral, Daily  budesonide-formoterol, 2 puff, Inhalation, BID - RT  cetirizine, 10 mg, Oral, Daily  cholecalciferol, 1,000 Units, Oral, Daily  dexamethasone, 6 mg, Oral, Daily With Breakfast  enoxaparin, 40 mg, Subcutaneous, Nightly  famotidine, 20 mg, Oral, BID  guaiFENesin, 600 mg, Oral, Q12H  zinc sulfate, 220 mg, Oral, Daily        Assessment/Plan         Acute bilateral low back pain without sciatica    Covid 19  PNA     Plan :        O2 sat 95%  On RA  supp care  decadron  Will follow  Thank  you            Suly Michelle MD  12/24/21  12:21 EST

## 2021-12-24 NOTE — DISCHARGE SUMMARY
Discharge summary    Date of admission 12/22/2021  Date of discharge 12/24/2021    Final diagnosis    COVID-19 infection  Low back pain  Osteoarthritis  Degenerative disease  Tobacco use  Gastroesophageal reflux disease     Discharge medications    Current Facility-Administered Medications:   •  acetaminophen (TYLENOL) tablet 650 mg, 650 mg, Oral, Q6H PRN, Abdoul Barrios MD, 650 mg at 12/23/21 0840  •  albuterol (PROVENTIL) nebulizer solution 0.083% 2.5 mg/3mL, 2.5 mg, Nebulization, Q6H PRN, Abdoul Barrios MD  •  budesonide-formoterol (SYMBICORT) 160-4.5 MCG/ACT inhaler 2 puff, 2 puff, Inhalation, BID - RT, Abdoul Barrios MD, 2 puff at 12/24/21 0934  •  cholecalciferol (VITAMIN D3) tablet 1,000 Units, 1,000 Units, Oral, Daily, Abdoul Barrios MD, 1,000 Units at 12/24/21 1013  •  dexamethasone (DECADRON) tablet 6 mg, 6 mg, Oral, Daily With Breakfast, Abdoul Barrios MD  •  enoxaparin (LOVENOX) syringe 40 mg, 40 mg, Subcutaneous, Nightly, Abdoul Barrios MD, 40 mg at 12/23/21 2036  •  famotidine (PEPCID) tablet 20 mg, 20 mg, Oral, BID, Abdoul Barrios MD, 20 mg at 12/23/21 2036  •  guaiFENesin (MUCINEX) 12 hr tablet 600 mg, 600 mg, Oral, Q12H, Abdoul Barrios MD, 600 mg at 12/24/21 1013  •  sodium chloride 0.9 % flush 10 mL, 10 mL, Intravenous, PRN, Ever Ames MD     Consults obtained  Infectious disease    Procedures  None    Hospital course  40-year-old female with no medical problem no home medications admit to emergency room with fever chills myalgia low back pain.  Patient work-up in ER revealed COVID-19 infection admit for management.  Patient did not receive COVID-19 vaccine and treated with supplemental oxygen as needed Decadron and symptomatic treatment.  Patient further evaluated by infectious disease and recommend to continue Decadron and clear for discharge.  Patient oxygen saturation 96% room air.  Patient symptoms improved and there is no evidence of pneumonia at this time.  Patient advised to continue  quarantine for total of 14 days.    Discharge diet regular    Activity as tolerated    Medication as above    Follow-up with primary doctor in 1 week and follow-up with infectious disease per the instruction and take medication as directed    SAMINA VALENCIA MD

## 2021-12-24 NOTE — PROGRESS NOTES
"Daily progress note    Chief complaint  Doing much better  No specific complaints  Wants to go home  O2 sats 96% on room air    History of present illness  40-year-old white female with no medical problems no medication presented to [facility] emergency room with low back pain myalgia fever chills but denies any shortness of breath or cough.  Patient work-up in ER revealed COVID-19 infection admit for management.  Patient also denies any loss of taste or smell.  Patient did not receive COVID-19 vaccine.     REVIEW OF SYSTEMS  All systems reviewed and negative except for those discussed in HPI.      PHYSICAL EXAM  Blood pressure 104/68, pulse (!) 48, temperature 98.6 °F (37 °C), temperature source Oral, resp. rate 16, height 175.3 cm (69\"), weight 86.2 kg (190 lb), SpO2 96 %.    GENERAL: Nontoxic-appearing  HENT: nares patent  EYES: no scleral icterus  CV: Sinus tachycardia  RESPIRATORY: normal effort  ABDOMEN: soft, nontender bowel sounds present.    MUSCULOSKELETAL: no deformity.  Normal neuro exam   NEURO: alert, moves all extremities, follows commands  SKIN: warm, dry     LAB RESULTS  Lab Results (last 24 hours)     Procedure Component Value Units Date/Time    TSH [[id number]]  (Abnormal) Collected: 12/24/21 0651    Specimen: Blood Updated: 12/24/21 0830     TSH 0.260 uIU/mL     BNP [[id number]]  (Normal) Collected: 12/24/21 0651    Specimen: Blood Updated: 12/24/21 0830     proBNP 265.2 pg/mL     Narrative:      Among patients with dyspnea, NT-proBNP is highly sensitive for the detection of acute congestive heart failure. In addition NT-proBNP of <300 pg/ml effectively rules out acute congestive heart failure with 99% negative predictive value.    Results may be falsely decreased if patient taking Biotin.      Ferritin [[id number]]  (Normal) Collected: 12/24/21 0651    Specimen: Blood Updated: 12/24/21 0830     Ferritin 102.00 ng/mL     Narrative:      Results may be falsely decreased if patient taking " Biotin.      Comprehensive Metabolic Panel [[id number]]  (Abnormal) Collected: 12/24/21 0651    Specimen: Blood Updated: 12/24/21 0826     Glucose 111 mg/dL      BUN 11 mg/dL      Creatinine 0.71 mg/dL      Sodium 142 mmol/L      Potassium 3.9 mmol/L      Chloride 108 mmol/L      CO2 25.3 mmol/L      Calcium 9.1 mg/dL      Total Protein 6.4 g/dL      Albumin 3.80 g/dL      ALT (SGPT) 14 U/L      AST (SGOT) 12 U/L      Alkaline Phosphatase 62 U/L      Total Bilirubin <0.2 mg/dL      eGFR Non African Amer 90 mL/min/1.73      Globulin 2.6 gm/dL      A/G Ratio 1.5 g/dL      BUN/Creatinine Ratio 15.5     Anion Gap 8.7 mmol/L     Narrative:      GFR Normal >60  Chronic Kidney Disease <60  Kidney Failure <15      C-reactive Protein [[id number]]  (Abnormal) Collected: 12/24/21 0651    Specimen: Blood Updated: 12/24/21 0826     C-Reactive Protein 0.56 mg/dL     Lipid Panel [[id number]] Collected: 12/24/21 0651    Specimen: Blood Updated: 12/24/21 0826     Total Cholesterol 129 mg/dL      Triglycerides 90 mg/dL      HDL Cholesterol 46 mg/dL      LDL Cholesterol  66 mg/dL      VLDL Cholesterol 17 mg/dL      LDL/HDL Ratio 1.41    Narrative:      Cholesterol Reference Ranges  (U.S. Department of Health and Human Services ATP III Classifications)    Desirable          <200 mg/dL  Borderline High    200-239 mg/dL  High Risk          >240 mg/dL      Triglyceride Reference Ranges  (U.S. Department of Health and Human Services ATP III Classifications)    Normal           <150 mg/dL  Borderline High  150-199 mg/dL  High             200-499 mg/dL  Very High        >500 mg/dL    HDL Reference Ranges  (U.S. Department of Health and Human Services ATP III Classifcations)    Low     <40 mg/dl (major risk factor for CHD)  High    >60 mg/dl ('negative' risk factor for CHD)        LDL Reference Ranges  (U.S. Department of Health and Human Services ATP III Classifcations)    Optimal          <100 mg/dL  Near Optimal     100-129 mg/dL  Borderline  High  130-159 mg/dL  High             160-189 mg/dL  Very High        >189 mg/dL    Hemoglobin A1c [[id number]]  (Normal) Collected: 12/24/21 0651    Specimen: Blood Updated: 12/24/21 0806     Hemoglobin A1C 5.30 %     Narrative:      Hemoglobin A1C Ranges:    Increased Risk for Diabetes  5.7% to 6.4%  Diabetes                     >= 6.5%  Diabetic Goal                < 7.0%    D-dimer, Quantitative [[id number]]  (Normal) Collected: 12/24/21 0651    Specimen: Blood Updated: 12/24/21 0806     D-Dimer, Quantitative 0.29 MCGFEU/mL     Narrative:      The Stago D-Dimer test used in conjunction with a clinical pretest probability (PTP) assessment model, has been approved by the FDA to rule out the presence of venous thromboembolism (VTE) in outpatients suspected of deep venous thrombosis (DVT) or pulmonary embolism (PE). The cut-off for negative predictive value is <0.50 MCGFEU/mL.    CBC & Differential [[id number]]  (Abnormal) Collected: 12/24/21 0651    Specimen: Blood Updated: 12/24/21 0749    Narrative:      The following orders were created for panel order CBC & Differential.  Procedure                               Abnormality         Status                     ---------                               -----------         ------                     CBC Auto Differential[[id number]]        Abnormal            Final result                 Please view results for these tests on the individual orders.    CBC Auto Differential [[id number]]  (Abnormal) Collected: 12/24/21 0651    Specimen: Blood Updated: 12/24/21 0749     WBC 4.27 10*3/mm3      RBC 3.78 10*6/mm3      Hemoglobin 12.9 g/dL      Hematocrit 37.2 %      MCV 98.4 fL      MCH 34.1 pg      MCHC 34.7 g/dL      RDW 11.9 %      RDW-SD 42.9 fl      MPV 11.4 fL      Platelets 180 10*3/mm3      Neutrophil % 41.9 %      Lymphocyte % 48.5 %      Monocyte % 9.4 %      Eosinophil % 0.0 %      Basophil % 0.0 %      Immature Grans % 0.2 %      Neutrophils, Absolute 1.79 10*3/mm3       Lymphocytes, Absolute 2.07 10*3/mm3      Monocytes, Absolute 0.40 10*3/mm3      Eosinophils, Absolute 0.00 10*3/mm3      Basophils, Absolute 0.00 10*3/mm3      Immature Grans, Absolute 0.01 10*3/mm3      nRBC 0.0 /100 WBC     Blood Culture - Blood, Arm, Left [[id number]]  (Normal) Collected: 12/22/21 2017    Specimen: Blood from Arm, Left Updated: 12/23/21 2030     Blood Culture No growth at 24 hours    Blood Culture - Blood, Arm, Left [[id number]]  (Normal) Collected: 12/22/21 2017    Specimen: Blood from Arm, Left Updated: 12/23/21 2030     Blood Culture No growth at 24 hours        Imaging Results (Last 24 Hours)     Procedure Component Value Units Date/Time    XR Chest 1 View [[id number]] Collected: 12/23/21 1358     Updated: 12/23/21 1411    Narrative:      ONE VIEW PORTABLE CHEST     HISTORY: Shortness of breath.     FINDINGS: The lungs are well-expanded and clear and the heart and hilar  structures appear normal. There is no acute disease.     This report was finalized on 12/23/2021 2:08 PM by Dr. Dat Marley M.D.             Current Facility-Administered Medications:   •  acetaminophen (TYLENOL) tablet 650 mg, 650 mg, Oral, Q6H PRN, Abdoul Barrios MD, 650 mg at 12/23/21 0840  •  albuterol (PROVENTIL) nebulizer solution 0.083% 2.5 mg/3mL, 2.5 mg, Nebulization, Q6H PRN, Abdoul Barrios MD  •  ascorbic acid (VITAMIN C) tablet 500 mg, 500 mg, Oral, Daily, Abdoul Barrios MD, 500 mg at 12/24/21 1013  •  budesonide-formoterol (SYMBICORT) 160-4.5 MCG/ACT inhaler 2 puff, 2 puff, Inhalation, BID - RT, Abdoul Barrios MD, 2 puff at 12/24/21 0934  •  cetirizine (zyrTEC) tablet 10 mg, 10 mg, Oral, Daily, Abdoul Barrios MD, 10 mg at 12/24/21 1013  •  cholecalciferol (VITAMIN D3) tablet 1,000 Units, 1,000 Units, Oral, Daily, Abdoul Barrios MD, 1,000 Units at 12/24/21 1013  •  dexamethasone (DECADRON) tablet 6 mg, 6 mg, Oral, Daily With Breakfast, Abdoul Barrios MD  •  enoxaparin (LOVENOX) syringe 40 mg, 40 mg, Subcutaneous,  Nightly, Samina Valencia MD, 40 mg at 12/23/21 2036  •  famotidine (PEPCID) tablet 20 mg, 20 mg, Oral, BID, Samina Valencia MD, 20 mg at 12/23/21 2036  •  guaiFENesin (MUCINEX) 12 hr tablet 600 mg, 600 mg, Oral, Q12H, Samina Valencia MD, 600 mg at 12/24/21 1013  •  sodium chloride 0.9 % flush 10 mL, 10 mL, Intravenous, PRN, Ever Ames MD  •  zinc sulfate (ZINCATE) capsule 220 mg, 220 mg, Oral, Daily, Samina Valencia MD, 220 mg at 12/24/21 1013     ASSESSMENT  COVID-19 infection  Low back pain  Osteoarthritis  Degenerative disease  Tobacco use  Gastroesophageal reflux disease     PLAN  Discharge home  Discharge summary dictated    SAMINA VALENCIA MD

## 2021-12-25 NOTE — OUTREACH NOTE
Prep Survey      Responses   [facility] patient discharged from? [place]   Is LACE score < 7 ? Yes   Emergency Room discharge w/ pulse ox? No   Eligibility [facility]   Date of Admission 12/22/21   Date of Discharge 12/24/21   Discharge Disposition Home or Self Care   Discharge diagnosis COVID-19 infection    Does the patient have one of the following disease processes/diagnoses(primary or secondary)? COVID-19   Does the patient have Home health ordered? No   Is there a DME ordered? No   Prep survey completed? Yes          Arlene Alvarez RN

## 2021-12-26 ENCOUNTER — TRANSITIONAL CARE MANAGEMENT TELEPHONE ENCOUNTER (OUTPATIENT)
Dept: CALL CENTER | Facility: HOSPITAL | Age: 41
End: 2021-12-26

## 2021-12-26 NOTE — OUTREACH NOTE
Call Center TCM Note      Responses   [facility] patient discharged from? [place]   Does the patient have one of the following disease processes/diagnoses(primary or secondary)? COVID-19   COVID-19 underlying condition? None   TCM attempt successful? Yes   Call start time 1129   Call end time 1133   Discharge diagnosis COVID-19 infection    Meds reviewed with patient/caregiver? Yes   Is the patient having any side effects they believe may be caused by any medication additions or changes? No   Does the patient have all medications ordered at discharge? Yes   Is the patient taking all medications as directed (includes completed medication regime)? Yes   Does the patient have a primary care provider?  Yes   Comments regarding PCP HOSP DC FU 1/3/22 @ 1pm   Does the patient have an appointment with their PCP or specialist within 7 days of discharge? Yes   Has the patient kept scheduled appointments due by today? N/A   Psychosocial issues? No   Did the patient receive a copy of their discharge instructions? Yes   Did the patient receive a copy of COVID-19 specific instructions? Yes   Nursing interventions Reviewed instructions with patient   What is the patient's perception of their health status since discharge? Improving   Does the patient have any of the following symptoms? Cough   Nursing Interventions Nurse provided patient education   Pulse Ox monitoring None   Is the patient/caregiver able to teach back steps to recovery at home? Eat a well-balance diet,  Rest and rebuild strength, gradually increase activity   If the patient is a current smoker, are they able to teach back resources for cessation? Smoking cessation medications,  [phone]  [Quit week ago]   Is the patient/caregiver able to teach back the hierarchy of who to call/visit for symptoms/problems? PCP, Specialist, Home health nurse, Urgent Care, ED, 911 Yes   TCM call completed? Yes   Wrap up additional comments Pt reports she is feeling  better. No needs at this time.           Nicki Gaviria RN    12/26/2021, 11:34 EST

## 2021-12-27 ENCOUNTER — READMISSION MANAGEMENT (OUTPATIENT)
Dept: CALL CENTER | Facility: HOSPITAL | Age: 41
End: 2021-12-27

## 2021-12-27 LAB
BACTERIA SPEC AEROBE CULT: NORMAL
BACTERIA SPEC AEROBE CULT: NORMAL

## 2021-12-27 NOTE — OUTREACH NOTE
COVID-19 Week 1 Survey      Responses   [facility] patient discharged from? [place]   Does the patient have one of the following disease processes/diagnoses(primary or secondary)? COVID-19   COVID-19 underlying condition? None   Call Number Call 2   Week 1 Call successful? No   Discharge diagnosis COVID-19 infection          Dana Cain RN

## 2021-12-27 NOTE — CASE MANAGEMENT/SOCIAL WORK
Continued Stay Note  [facility]     Patient Name: Quin Carrington  MRN: [id number]  Today's Date: 12/27/2021    Admit Date: 12/22/2021     Discharge Plan     Row Name 12/27/21 0935       Plan    Final Discharge Disposition Code 01 - home or self-care    Final Note home no additional dc orders noted. Susan ascencio/ccp               Discharge Codes    No documentation.               Expected Discharge Date and Time     Expected Discharge Date Expected Discharge Time    Dec 24, 2021             Geno May RN

## 2021-12-28 ENCOUNTER — READMISSION MANAGEMENT (OUTPATIENT)
Dept: CALL CENTER | Facility: HOSPITAL | Age: 41
End: 2021-12-28

## 2021-12-28 NOTE — OUTREACH NOTE
COVID-19 Week 1 Survey      Responses   [facility] patient discharged from? [place]   Does the patient have one of the following disease processes/diagnoses(primary or secondary)? COVID-19   COVID-19 underlying condition? None   Call Number Call 3   Week 1 Call successful? Yes   Call start time 1436   Call end time 1438   Discharge diagnosis COVID-19 infection   Is patient permission given to speak with other caregiver? Yes   List who call center can speak with mother   Is the patient taking all medications as directed (includes completed medication regime)? Yes   Comments regarding PCP HOSP DC FU 1/3/22 @ 1pm   Has the patient kept scheduled appointments due by today? N/A   Psychosocial issues? No   Comments Not doing deep breathing at home.   What is the patient's perception of their health status since discharge? Improving   Does the patient have any of the following symptoms? Cough  [congestion, ]   Nursing Interventions Nurse provided patient education   Pulse Ox monitoring None   Is the patient/caregiver able to teach back steps to recovery at home? Set small, achievable goals for return to baseline health,  Rest and rebuild strength, gradually increase activity   Is the patient/caregiver able to teach back the hierarchy of who to call/visit for symptoms/problems? PCP, Specialist, Home health nurse, Urgent Care, ED, 911 Yes   COVID-19 call completed? Yes   Wrap up additional comments Pt reports she is feeling better. No needs at this time.           Destiny Luna RN

## 2021-12-30 ENCOUNTER — READMISSION MANAGEMENT (OUTPATIENT)
Dept: CALL CENTER | Facility: HOSPITAL | Age: 41
End: 2021-12-30

## 2021-12-30 NOTE — OUTREACH NOTE
COVID-19 Week 2 Survey      Responses   [facility] patient discharged from? [place]   Does the patient have one of the following disease processes/diagnoses(primary or secondary)? COVID-19   COVID-19 underlying condition? None   Call Number Call 1   COVID-19 Week 2: Call 1 attempt successful? Yes   Call start time 1431   Call end time 1434   Discharge diagnosis COVID-19 infection   Meds reviewed with patient/caregiver? Yes   Is the patient taking all medications as directed (includes completed medication regime)? Yes   Has the patient kept scheduled appointments due by today? N/A   What is the patient's perception of their health status since discharge? Improving   Does the patient have any of the following symptoms? Cough  [body aches]   Pulse Ox monitoring None   Is the patient/caregiver able to teach back steps to recovery at home? Rest and rebuild strength, gradually increase activity,  Eat a well-balance diet   If the patient is a current smoker, are they able to teach back resources for cessation? --  [Pt has not smoked since she went home]   COVID-19 call completed? Yes          Grace Romero RN

## 2022-01-03 ENCOUNTER — OFFICE VISIT (OUTPATIENT)
Dept: FAMILY MEDICINE CLINIC | Facility: CLINIC | Age: 42
End: 2022-01-03

## 2022-01-03 VITALS
BODY MASS INDEX: 30.33 KG/M2 | HEART RATE: 85 BPM | OXYGEN SATURATION: 97 % | DIASTOLIC BLOOD PRESSURE: 72 MMHG | WEIGHT: 204.8 LBS | HEIGHT: 69 IN | SYSTOLIC BLOOD PRESSURE: 122 MMHG | TEMPERATURE: 97.7 F

## 2022-01-03 DIAGNOSIS — T14.8XXA MUSCLE STRAIN: Primary | ICD-10-CM

## 2022-01-03 PROBLEM — Z72.0 TOBACCO ABUSE: Status: ACTIVE | Noted: 2019-10-09

## 2022-01-03 PROCEDURE — 99495 TRANSJ CARE MGMT MOD F2F 14D: CPT | Performed by: NURSE PRACTITIONER

## 2022-01-03 RX ORDER — ALBUTEROL SULFATE 90 UG/1
2 AEROSOL, METERED RESPIRATORY (INHALATION)
COMMUNITY
Start: 2021-08-25

## 2022-01-03 RX ORDER — MELOXICAM 7.5 MG/1
7.5 TABLET ORAL DAILY
Qty: 30 TABLET | Refills: 0 | Status: SHIPPED | OUTPATIENT
Start: 2022-01-03 | End: 2022-02-02

## 2022-01-03 RX ORDER — CYCLOBENZAPRINE HCL 10 MG
10 TABLET ORAL 3 TIMES DAILY PRN
Qty: 63 TABLET | Refills: 0 | Status: SHIPPED | OUTPATIENT
Start: 2022-01-03 | End: 2022-01-24

## 2022-01-03 NOTE — PROGRESS NOTES
Transitional Care Follow Up Visit  Subjective     Quin Carrington is a 41 y.o. female who presents for a transitional care management visit.    Within 48 business hours after discharge our office contacted her via telephone to coordinate her care and needs.      I reviewed and discussed the details of that call along with the discharge summary, hospital problems, inpatient lab results, inpatient diagnostic studies, and consultation reports with Quin.     Current outpatient and discharge medications have been reconciled for the patient.  Reviewed by: CAMILO Gu      Date of TCM Phone Call 12/24/2021   [facility]   Date of Admission 12/22/2021   Date of Discharge 12/24/2021   Discharge Disposition Home or Self Care     Risk for Readmission (LACE) Score: 2 (12/24/2021  6:01 AM)      History of Present Illness   Course During Hospital Stay:  40-year-old female came to emergency room with fever, chills, myalgia, and low back pain.  Work-up in ER revealed COVID-19 infection and patient was admitted for further management.  Patient did not receive COVID-19 vaccine prior to admission and was treated with supplemental oxygen PRN, Decadron, and symptomatic treatment.  Patient further evaluated by infectious disease and recommend to continue Decadron and was cleared for discharge. Patient symptoms improved and there is no evidence of pneumonia at this time.  Patient advised to continue quarantine for total of 14 days.    Patient has had back pain for 3 months. Patient has a history of frequent kidney infections and stones. While in the hospital, the tests that were run revealed no cause for the back/flank pain. 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 Concerned for possible muscle strain.       The following portions of the patient's history were reviewed and updated as appropriate: allergies, current medications, past family history,  past medical history, past social history, past surgical history and problem list.    Review of Systems   Constitutional: Positive for fatigue. Negative for fever.   HENT: Negative for congestion, rhinorrhea, sneezing and sore throat.    Respiratory: Positive for cough. Negative for shortness of breath and wheezing.    Cardiovascular: Negative for chest pain, palpitations and leg swelling.   Gastrointestinal: Negative for abdominal pain, constipation, diarrhea, nausea and vomiting.   Musculoskeletal: Positive for back pain. Negative for neck pain.   Neurological: Negative for dizziness, numbness and headaches.   Psychiatric/Behavioral: The patient is not nervous/anxious.        Objective   Physical Exam  Vitals reviewed.   Constitutional:       General: She is awake. She is not in acute distress.     Appearance: Normal appearance.   HENT:      Head: Normocephalic.      Right Ear: Hearing, tympanic membrane, ear canal and external ear normal.      Left Ear: Hearing, tympanic membrane, ear canal and external ear normal.   Eyes:      General: Lids are normal. Vision grossly intact.      Pupils: Pupils are equal, round, and reactive to light.   Neck:      Thyroid: No thyroid mass or thyroid tenderness.   Cardiovascular:      Rate and Rhythm: Normal rate and regular rhythm.      Pulses: Normal pulses.           Radial pulses are 2+ on the right side and 2+ on the left side.        Dorsalis pedis pulses are 2+ on the right side and 2+ on the left side.        Posterior tibial pulses are 2+ on the right side and 2+ on the left side.      Heart sounds: Normal heart sounds.   Pulmonary:      Effort: Pulmonary effort is normal.      Breath sounds: Normal breath sounds. No decreased breath sounds, wheezing, rhonchi or rales.   Abdominal:      General: Abdomen is flat. Bowel sounds are normal.      Palpations: Abdomen is soft.      Tenderness: There is no abdominal tenderness. There is no right CVA tenderness or left CVA  tenderness.   Musculoskeletal:      Cervical back: Normal and neck supple.      Thoracic back: Tenderness (right sided) present. No bony tenderness.   Lymphadenopathy:      Cervical: No cervical adenopathy.   Skin:     General: Skin is warm and dry.      Capillary Refill: Capillary refill takes less than 2 seconds.   Neurological:      General: No focal deficit present.      Mental Status: She is alert.   Psychiatric:         Attention and Perception: Attention normal.         Mood and Affect: Mood normal.         Speech: Speech normal.         Behavior: Behavior is cooperative.         Assessment/Plan   Diagnoses and all orders for this visit:    1. Muscle strain (Primary)  -     meloxicam (Mobic) 7.5 MG tablet; Take 1 tablet by mouth Daily for 30 days.  Dispense: 30 tablet; Refill: 0  -     cyclobenzaprine (FLEXERIL) 10 MG tablet; Take 1 tablet by mouth 3 (Three) Times a Day As Needed for Muscle Spasms for up to 21 days.  Dispense: 63 tablet; Refill: 0    Discussed with patient that symptoms could be due to muscle strain. Prescribed meloxicam to help with inflammation and flexeril to help with spasms. Patient will follow up in 1 month to see if medications have helped the back pain. If pain has not improved or has worsened, will possibly get CT stone protocol to check for kidney stones due to patient's history.

## 2022-03-03 ENCOUNTER — HOSPITAL ENCOUNTER (EMERGENCY)
Facility: HOSPITAL | Age: 42
Discharge: HOME | End: 2022-03-03
Attending: EMERGENCY MEDICINE | Admitting: EMERGENCY MEDICINE

## 2022-03-03 VITALS
HEART RATE: 70 BPM | SYSTOLIC BLOOD PRESSURE: 120 MMHG | TEMPERATURE: 97.8 F | RESPIRATION RATE: 15 BRPM | OXYGEN SATURATION: 98 % | DIASTOLIC BLOOD PRESSURE: 65 MMHG

## 2022-03-03 DIAGNOSIS — M54.50 ACUTE BILATERAL LOW BACK PAIN WITHOUT SCIATICA: Primary | ICD-10-CM

## 2022-03-03 LAB
ALBUMIN SERPL-MCNC: 3.9 G/DL (ref 3.5–5.2)
ALBUMIN/GLOB SERPL: 1.6 G/DL
ALP SERPL-CCNC: 64 U/L (ref 39–117)
ALT SERPL W P-5'-P-CCNC: 16 U/L (ref 1–33)
ANION GAP SERPL CALCULATED.3IONS-SCNC: 8 MMOL/L (ref 5–15)
AST SERPL-CCNC: 16 U/L (ref 1–32)
B-HCG UR QL: NEGATIVE
BASOPHILS # BLD AUTO: 0.03 10*3/MM3 (ref 0–0.2)
BASOPHILS NFR BLD AUTO: 0.5 % (ref 0–1.5)
BILIRUB SERPL-MCNC: 0.2 MG/DL (ref 0–1.2)
BILIRUB UR QL STRIP: NEGATIVE
BUN SERPL-MCNC: 16 MG/DL (ref 6–20)
BUN/CREAT SERPL: 22.5 (ref 7–25)
CALCIUM SPEC-SCNC: 9 MG/DL (ref 8.6–10.5)
CHLORIDE SERPL-SCNC: 107 MMOL/L (ref 98–107)
CLARITY UR: CLEAR
CO2 SERPL-SCNC: 25 MMOL/L (ref 22–29)
COLOR UR: YELLOW
CREAT SERPL-MCNC: 0.71 MG/DL (ref 0.57–1)
DEPRECATED RDW RBC AUTO: 42.9 FL (ref 37–54)
EGFRCR SERPLBLD CKD-EPI 2021: 108.3 ML/MIN/1.73
EOSINOPHIL # BLD AUTO: 0.09 10*3/MM3 (ref 0–0.4)
EOSINOPHIL NFR BLD AUTO: 1.4 % (ref 0.3–6.2)
ERYTHROCYTE [DISTWIDTH] IN BLOOD BY AUTOMATED COUNT: 12.1 % (ref 12.3–15.4)
GLOBULIN UR ELPH-MCNC: 2.5 GM/DL
GLUCOSE SERPL-MCNC: 101 MG/DL (ref 65–99)
GLUCOSE UR STRIP-MCNC: NEGATIVE MG/DL
HCT VFR BLD AUTO: 34.8 % (ref 34–46.6)
HGB BLD-MCNC: 12.3 G/DL (ref 12–15.9)
HGB UR QL STRIP.AUTO: NEGATIVE
IMM GRANULOCYTES # BLD AUTO: 0.01 10*3/MM3 (ref 0–0.05)
IMM GRANULOCYTES NFR BLD AUTO: 0.2 % (ref 0–0.5)
KETONES UR QL STRIP: ABNORMAL
LEUKOCYTE ESTERASE UR QL STRIP.AUTO: NEGATIVE
LYMPHOCYTES # BLD AUTO: 2.43 10*3/MM3 (ref 0.7–3.1)
LYMPHOCYTES NFR BLD AUTO: 37.8 % (ref 19.6–45.3)
MCH RBC QN AUTO: 34.2 PG (ref 26.6–33)
MCHC RBC AUTO-ENTMCNC: 35.3 G/DL (ref 31.5–35.7)
MCV RBC AUTO: 96.7 FL (ref 79–97)
MONOCYTES # BLD AUTO: 0.39 10*3/MM3 (ref 0.1–0.9)
MONOCYTES NFR BLD AUTO: 6.1 % (ref 5–12)
NEUTROPHILS NFR BLD AUTO: 3.48 10*3/MM3 (ref 1.7–7)
NEUTROPHILS NFR BLD AUTO: 54 % (ref 42.7–76)
NITRITE UR QL STRIP: NEGATIVE
NRBC BLD AUTO-RTO: 0 /100 WBC (ref 0–0.2)
PH UR STRIP.AUTO: 6.5 [PH] (ref 5–8)
PLATELET # BLD AUTO: 210 10*3/MM3 (ref 140–450)
PMV BLD AUTO: 10.7 FL (ref 6–12)
POTASSIUM SERPL-SCNC: 4.4 MMOL/L (ref 3.5–5.2)
PROT SERPL-MCNC: 6.4 G/DL (ref 6–8.5)
PROT UR QL STRIP: NEGATIVE
RBC # BLD AUTO: 3.6 10*6/MM3 (ref 3.77–5.28)
SODIUM SERPL-SCNC: 140 MMOL/L (ref 136–145)
SP GR UR STRIP: 1.03 (ref 1–1.03)
UROBILINOGEN UR QL STRIP: ABNORMAL
WBC NRBC COR # BLD: 6.43 10*3/MM3 (ref 3.4–10.8)

## 2022-03-03 PROCEDURE — 25010000002 TRIAMCINOLONE PER 10 MG: Performed by: NURSE PRACTITIONER

## 2022-03-03 PROCEDURE — 99283 EMERGENCY DEPT VISIT LOW MDM: CPT

## 2022-03-03 PROCEDURE — 96372 THER/PROPH/DIAG INJ SC/IM: CPT

## 2022-03-03 PROCEDURE — 81025 URINE PREGNANCY TEST: CPT | Performed by: NURSE PRACTITIONER

## 2022-03-03 PROCEDURE — 81003 URINALYSIS AUTO W/O SCOPE: CPT | Performed by: NURSE PRACTITIONER

## 2022-03-03 PROCEDURE — 25010000002 KETOROLAC TROMETHAMINE PER 15 MG: Performed by: NURSE PRACTITIONER

## 2022-03-03 PROCEDURE — 80053 COMPREHEN METABOLIC PANEL: CPT | Performed by: NURSE PRACTITIONER

## 2022-03-03 PROCEDURE — 85025 COMPLETE CBC W/AUTO DIFF WBC: CPT | Performed by: NURSE PRACTITIONER

## 2022-03-03 PROCEDURE — 96374 THER/PROPH/DIAG INJ IV PUSH: CPT

## 2022-03-03 RX ORDER — INDOMETHACIN 75 MG/1
75 CAPSULE, EXTENDED RELEASE ORAL 2 TIMES DAILY WITH MEALS
Qty: 12 CAPSULE | Refills: 0 | OUTPATIENT
Start: 2022-03-03 | End: 2022-06-29

## 2022-03-03 RX ORDER — SODIUM CHLORIDE 0.9 % (FLUSH) 0.9 %
10 SYRINGE (ML) INJECTION AS NEEDED
Status: DISCONTINUED | OUTPATIENT
Start: 2022-03-03 | End: 2022-03-03 | Stop reason: HOSPADM

## 2022-03-03 RX ORDER — TRIAMCINOLONE ACETONIDE 40 MG/ML
40 INJECTION, SUSPENSION INTRA-ARTICULAR; INTRAMUSCULAR ONCE
Status: COMPLETED | OUTPATIENT
Start: 2022-03-03 | End: 2022-03-03

## 2022-03-03 RX ORDER — KETOROLAC TROMETHAMINE 15 MG/ML
15 INJECTION, SOLUTION INTRAMUSCULAR; INTRAVENOUS ONCE
Status: COMPLETED | OUTPATIENT
Start: 2022-03-03 | End: 2022-03-03

## 2022-03-03 RX ORDER — ACETAMINOPHEN 500 MG
1000 TABLET ORAL ONCE
Status: COMPLETED | OUTPATIENT
Start: 2022-03-03 | End: 2022-03-03

## 2022-03-03 RX ADMIN — TRIAMCINOLONE ACETONIDE 40 MG: 40 INJECTION, SUSPENSION INTRA-ARTICULAR; INTRAMUSCULAR at 17:19

## 2022-03-03 RX ADMIN — KETOROLAC TROMETHAMINE 15 MG: 15 INJECTION, SOLUTION INTRAMUSCULAR; INTRAVENOUS at 16:22

## 2022-03-03 RX ADMIN — ACETAMINOPHEN 1000 MG: 500 TABLET ORAL at 16:22

## 2022-03-03 NOTE — ED NOTES
Lumbar back pain that started this morning. Denies injury or fall. Pt states that she has had increased urinary frequency.     Patient was placed in face mask during triage process. Patient was wearing facemask when I entered the room and throughout our encounter. I wore full protective equipment throughout this patient encounter including a face mask, eye protection, and gloves. Hand hygiene was performed before donning protective equipment and again following doffing of PPE after leaving the room.       Ayesha Youngblood, RN  03/03/22 [id number]

## 2022-03-03 NOTE — DISCHARGE INSTRUCTIONS
Ice on and off to lower back    Can try over the counter muscle rub    Return Precautions    Although you are being discharged from the ED today, I encourage you to return for worsening symptoms.  Things can, and do, change such that treatment at home with medication may not be adequate.      Specifically, return for any of the following:    Chest pain, shortness of breath, pain or nausea and vomiting not controlled by medications provided.    Please make a follow up with your Primary Care Provider for a blood pressure recheck.

## 2022-03-03 NOTE — ED PROVIDER NOTES
"EMERGENCY DEPARTMENT ENCOUNTER    Room Number:  07/07  Date seen:  3/3/2022  Time seen: 15:56 EST  PCP: Oh Lira APRN  Historian: patient    HPI:  Chief complaint:low back pain  A complete HPI/ROS/PMH/PSH/SH/FH are unobtainable due to:  n/a  Context:Quin Carrington is a 41 y.o. female who presents to the ED with c/o waking up with moderate lower back pain with associated feeling that there are \"weights on my feet\" and urinary frequency. It is not made better by home dose of Ibuprofen.  It is made worse by movement.  She states it is similar to prior episodes of kidney infection.  She denies any known injury or change in work habits; no falls or recent working out.  No fever/chills, dysuria, vaginal discharge or n/v/d.     Patient was placed in face mask in first look. Patient was wearing facemask when I entered the room and throughout our encounter. I wore full protective equipment throughout this patient encounter including a N95 face mask, eye shield and gloves. Hand hygiene/washing of hands was performed before donning protective equipment and after removal when leaving the room.      MEDICAL RECORD REVIEW    ALLERGIES  Sulfamethoxazole and Sulfa antibiotics    PAST MEDICAL HISTORY  Active Ambulatory Problems     Diagnosis Date Noted   • Acute bilateral low back pain without sciatica 12/22/2021   • Tobacco abuse 10/09/2019     Resolved Ambulatory Problems     Diagnosis Date Noted   • No Resolved Ambulatory Problems     Past Medical History:   Diagnosis Date   • Patient denies medical problems        PAST SURGICAL HISTORY  Past Surgical History:   Procedure Laterality Date   • TUBAL ABDOMINAL LIGATION         FAMILY HISTORY  Family History   Problem Relation Age of Onset   • No Known Problems Father    • No Known Problems Mother        SOCIAL HISTORY  Social History     Socioeconomic History   • Marital status: Single   Tobacco Use   • Smoking status: Current Every Day Smoker   • Smokeless tobacco: " Never Used   • Tobacco comment: hasn't smoked in 2 weeks   Vaping Use   • Vaping Use: Never used   Substance and Sexual Activity   • Alcohol use: Not Currently   • Drug use: Yes     Types: Marijuana   • Sexual activity: Not Currently     Birth control/protection: Tubal ligation       REVIEW OF SYSTEMS  Review of Systems    All systems reviewed and negative except for those discussed in HPI.     PHYSICAL EXAM    ED Triage Vitals [03/03/22 1550]   Temp Heart Rate Resp BP SpO2   97.8 °F (36.6 °C) 99 18 -- 97 %      Temp src Heart Rate Source Patient Position BP Location FiO2 (%)   -- -- -- -- --     Physical Exam    I have reviewed the triage vital signs and nursing notes.      GENERAL: not distressed  HENT: nares patent  EYES: no scleral icterus  NECK: no ROM limitations  CV: regular rhythm, regular rate, no murmur, no rubs, no gallups  RESPIRATORY: normal effort, CTAB  ABDOMEN: soft  : deferred  MUSCULOSKELETAL: no deformity, bilateral lower back pain.  No lumbar spine pain.  Pedal pushes are equal.    NEURO: alert, moves all extremities, follows commands  SKIN: warm, dry    LAB RESULTS  Recent Results (from the past 24 hour(s))   Pregnancy, Urine - Urine, Clean Catch    Collection Time: 03/03/22  4:08 PM    Specimen: Urine, Clean Catch   Result Value Ref Range    HCG, Urine QL Negative Negative   Urinalysis With Microscopic If Indicated (No Culture) - Urine, Clean Catch    Collection Time: 03/03/22  4:08 PM    Specimen: Urine, Clean Catch   Result Value Ref Range    Color, UA Yellow Yellow, Straw    Appearance, UA Clear Clear    pH, UA 6.5 5.0 - 8.0    Specific Gravity, UA 1.028 1.005 - 1.030    Glucose, UA Negative Negative    Ketones, UA Trace (A) Negative    Bilirubin, UA Negative Negative    Blood, UA Negative Negative    Protein, UA Negative Negative    Leuk Esterase, UA Negative Negative    Nitrite, UA Negative Negative    Urobilinogen, UA 1.0 E.U./dL 0.2 - 1.0 E.U./dL   Comprehensive Metabolic Panel     Collection Time: 03/03/22  4:16 PM    Specimen: Blood   Result Value Ref Range    Glucose 101 (H) 65 - 99 mg/dL    BUN 16 6 - 20 mg/dL    Creatinine 0.71 0.57 - 1.00 mg/dL    Sodium 140 136 - 145 mmol/L    Potassium 4.4 3.5 - 5.2 mmol/L    Chloride 107 98 - 107 mmol/L    CO2 25.0 22.0 - 29.0 mmol/L    Calcium 9.0 8.6 - 10.5 mg/dL    Total Protein 6.4 6.0 - 8.5 g/dL    Albumin 3.90 3.50 - 5.20 g/dL    ALT (SGPT) 16 1 - 33 U/L    AST (SGOT) 16 1 - 32 U/L    Alkaline Phosphatase 64 39 - 117 U/L    Total Bilirubin 0.2 0.0 - 1.2 mg/dL    Globulin 2.5 gm/dL    A/G Ratio 1.6 g/dL    BUN/Creatinine Ratio 22.5 7.0 - 25.0    Anion Gap 8.0 5.0 - 15.0 mmol/L    eGFR 108.3 >60.0 mL/min/1.73   CBC Auto Differential    Collection Time: 03/03/22  4:16 PM    Specimen: Blood   Result Value Ref Range    WBC 6.43 3.40 - 10.80 10*3/mm3    RBC 3.60 (L) 3.77 - 5.28 10*6/mm3    Hemoglobin 12.3 12.0 - 15.9 g/dL    Hematocrit 34.8 34.0 - 46.6 %    MCV 96.7 79.0 - 97.0 fL    MCH 34.2 (H) 26.6 - 33.0 pg    MCHC 35.3 31.5 - 35.7 g/dL    RDW 12.1 (L) 12.3 - 15.4 %    RDW-SD 42.9 37.0 - 54.0 fl    MPV 10.7 6.0 - 12.0 fL    Platelets 210 140 - 450 10*3/mm3    Neutrophil % 54.0 42.7 - 76.0 %    Lymphocyte % 37.8 19.6 - 45.3 %    Monocyte % 6.1 5.0 - 12.0 %    Eosinophil % 1.4 0.3 - 6.2 %    Basophil % 0.5 0.0 - 1.5 %    Immature Grans % 0.2 0.0 - 0.5 %    Neutrophils, Absolute 3.48 1.70 - 7.00 10*3/mm3    Lymphocytes, Absolute 2.43 0.70 - 3.10 10*3/mm3    Monocytes, Absolute 0.39 0.10 - 0.90 10*3/mm3    Eosinophils, Absolute 0.09 0.00 - 0.40 10*3/mm3    Basophils, Absolute 0.03 0.00 - 0.20 10*3/mm3    Immature Grans, Absolute 0.01 0.00 - 0.05 10*3/mm3    nRBC 0.0 0.0 - 0.2 /100 WBC         RADIOLOGY RESULTS  No Radiology Exams Resulted Within Past 24 Hours       PROGRESS, DATA ANALYSIS, CONSULTS AND MEDICAL DECISION MAKING  All labs have been independently reviewed by me.  All radiology studies have been reviewed by me and discussed with  radiologist dictating the report.  EKG's independently viewed and interpreted by me unless stated otherwise. Discussion below represents my analysis of pertinent findings related to patient's condition, differential diagnosis, treatment plan and final disposition.     ED Course as of 03/03/22 1727   Thu Mar 03, 2022   1613 DDX: lumbar strain, UTI, pyelo, muscle spasm    I discussed plan with patient to check UA and basic labs.  I have ordered meds to treat pain.  [EW]   1614 MDM/dispo:  I updated patient on results of workup.  Her labs are normal.  No UTI.  I suspect this is lumbago or lumbar spasm.  I offered to send her home with muscle relaxer but she says she doesn't like the way they make her feel.  She is agreeable to dose of steroid and will use NSAIDS.  The patient endorse NO: fevers, chills, recent spinal procedures, bowel/bladder incontinence, IV drug use, cancer, recent weight loss, trauma ,weakness numbness, tingling, or dysuria     [EW]   1638 HCG, Urine QL: Negative [EW]   1639 Nitrite, UA: Negative [EW]      ED Course User Index  [EW] Carmen Valera APRN       Reviewed pt's history and workup with Dr. Julian.  After a bedside evaluation, Dr. Julian agrees with the plan of care.    The patient's history, physical exam, and lab findings were discussed with the physician, who also performed a face to face history and physical exam.  I discussed all results and noted any abnormalities with patient.  Discussed absoute need to recheck abnormalities with their family physician.  I answered any of the patient's questions.  Discussed plan for discharge, as there is no emergent indication for admission.  Pt is agreeable and understands need for follow up and repeat testing.  Pt is aware that discharge does not mean that nothing is wrong but it indicates no emergency is present and they must continue care with their family physician.  Pt is discharged with instructions to follow up with primary care doctor  to have their blood pressure rechecked.       Disposition vitals:  /61   Pulse 72   Temp 97.8 °F (36.6 °C)   Resp 16   LMP 02/28/2022   SpO2 97%       DIAGNOSIS  Final diagnoses:   Acute bilateral low back pain without sciatica       FOLLOW UP   Oh Lira, APRN  [id number] [id number]  [phone]    Schedule an appointment as soon as possible for a visit in 1 day  As needed         Carmen Valera, APRN  03/03/22 6539

## 2022-06-29 ENCOUNTER — HOSPITAL ENCOUNTER (EMERGENCY)
Facility: HOSPITAL | Age: 42
Discharge: LEFT BEFORE DISCHARGE | End: 2022-06-29

## 2022-06-29 ENCOUNTER — APPOINTMENT (OUTPATIENT)
Dept: CT IMAGING | Facility: HOSPITAL | Age: 42
End: 2022-06-29

## 2022-06-29 ENCOUNTER — HOSPITAL ENCOUNTER (EMERGENCY)
Facility: HOSPITAL | Age: 42
Discharge: HOME | End: 2022-06-29
Attending: EMERGENCY MEDICINE | Admitting: EMERGENCY MEDICINE

## 2022-06-29 VITALS
RESPIRATION RATE: 20 BRPM | HEART RATE: 81 BPM | OXYGEN SATURATION: 100 % | DIASTOLIC BLOOD PRESSURE: 92 MMHG | TEMPERATURE: 96.7 F | SYSTOLIC BLOOD PRESSURE: 140 MMHG

## 2022-06-29 VITALS
SYSTOLIC BLOOD PRESSURE: 121 MMHG | DIASTOLIC BLOOD PRESSURE: 75 MMHG | TEMPERATURE: 98.1 F | WEIGHT: 195 LBS | HEART RATE: 66 BPM | HEIGHT: 68 IN | OXYGEN SATURATION: 93 % | RESPIRATION RATE: 16 BRPM | BODY MASS INDEX: 29.55 KG/M2

## 2022-06-29 DIAGNOSIS — N20.1 URETEROLITHIASIS: Primary | ICD-10-CM

## 2022-06-29 DIAGNOSIS — N23 RENAL COLIC ON RIGHT SIDE: ICD-10-CM

## 2022-06-29 LAB
ALBUMIN SERPL-MCNC: 4.3 G/DL (ref 3.5–5.2)
ALBUMIN/GLOB SERPL: 1.5 G/DL
ALP SERPL-CCNC: 72 U/L (ref 39–117)
ALT SERPL W P-5'-P-CCNC: 16 U/L (ref 1–33)
ANION GAP SERPL CALCULATED.3IONS-SCNC: 15.5 MMOL/L (ref 5–15)
AST SERPL-CCNC: 10 U/L (ref 1–32)
BACTERIA UR QL AUTO: ABNORMAL /HPF
BASOPHILS # BLD AUTO: 0.03 10*3/MM3 (ref 0–0.2)
BASOPHILS NFR BLD AUTO: 0.4 % (ref 0–1.5)
BILIRUB SERPL-MCNC: 0.4 MG/DL (ref 0–1.2)
BILIRUB UR QL STRIP: NEGATIVE
BUN SERPL-MCNC: 16 MG/DL (ref 6–20)
BUN/CREAT SERPL: 19.8 (ref 7–25)
CALCIUM SPEC-SCNC: 9.8 MG/DL (ref 8.6–10.5)
CHLORIDE SERPL-SCNC: 104 MMOL/L (ref 98–107)
CLARITY UR: ABNORMAL
CO2 SERPL-SCNC: 21.5 MMOL/L (ref 22–29)
COLOR UR: YELLOW
CREAT SERPL-MCNC: 0.81 MG/DL (ref 0.57–1)
DEPRECATED RDW RBC AUTO: 43.8 FL (ref 37–54)
EGFRCR SERPLBLD CKD-EPI 2021: 92.5 ML/MIN/1.73
EOSINOPHIL # BLD AUTO: 0.1 10*3/MM3 (ref 0–0.4)
EOSINOPHIL NFR BLD AUTO: 1.3 % (ref 0.3–6.2)
ERYTHROCYTE [DISTWIDTH] IN BLOOD BY AUTOMATED COUNT: 12.3 % (ref 12.3–15.4)
GLOBULIN UR ELPH-MCNC: 2.8 GM/DL
GLUCOSE SERPL-MCNC: 113 MG/DL (ref 65–99)
GLUCOSE UR STRIP-MCNC: NEGATIVE MG/DL
HCG SERPL QL: NEGATIVE
HCT VFR BLD AUTO: 39.9 % (ref 34–46.6)
HGB BLD-MCNC: 13.8 G/DL (ref 12–15.9)
HGB UR QL STRIP.AUTO: ABNORMAL
HYALINE CASTS UR QL AUTO: ABNORMAL /LPF
IMM GRANULOCYTES # BLD AUTO: 0.02 10*3/MM3 (ref 0–0.05)
IMM GRANULOCYTES NFR BLD AUTO: 0.3 % (ref 0–0.5)
KETONES UR QL STRIP: ABNORMAL
LEUKOCYTE ESTERASE UR QL STRIP.AUTO: ABNORMAL
LIPASE SERPL-CCNC: 32 U/L (ref 13–60)
LYMPHOCYTES # BLD AUTO: 3.58 10*3/MM3 (ref 0.7–3.1)
LYMPHOCYTES NFR BLD AUTO: 47 % (ref 19.6–45.3)
MCH RBC QN AUTO: 33.7 PG (ref 26.6–33)
MCHC RBC AUTO-ENTMCNC: 34.6 G/DL (ref 31.5–35.7)
MCV RBC AUTO: 97.6 FL (ref 79–97)
MONOCYTES # BLD AUTO: 0.43 10*3/MM3 (ref 0.1–0.9)
MONOCYTES NFR BLD AUTO: 5.7 % (ref 5–12)
MUCOUS THREADS URNS QL MICRO: ABNORMAL /HPF
NEUTROPHILS NFR BLD AUTO: 3.45 10*3/MM3 (ref 1.7–7)
NEUTROPHILS NFR BLD AUTO: 45.3 % (ref 42.7–76)
NITRITE UR QL STRIP: NEGATIVE
NRBC BLD AUTO-RTO: 0 /100 WBC (ref 0–0.2)
PH UR STRIP.AUTO: 5.5 [PH] (ref 5–8)
PLATELET # BLD AUTO: 247 10*3/MM3 (ref 140–450)
PMV BLD AUTO: 10.8 FL (ref 6–12)
POTASSIUM SERPL-SCNC: 3.8 MMOL/L (ref 3.5–5.2)
PROT SERPL-MCNC: 7.1 G/DL (ref 6–8.5)
PROT UR QL STRIP: ABNORMAL
RBC # BLD AUTO: 4.09 10*6/MM3 (ref 3.77–5.28)
RBC # UR STRIP: ABNORMAL /HPF
REF LAB TEST METHOD: ABNORMAL
SODIUM SERPL-SCNC: 141 MMOL/L (ref 136–145)
SP GR UR STRIP: 1.03 (ref 1–1.03)
SQUAMOUS #/AREA URNS HPF: ABNORMAL /HPF
UROBILINOGEN UR QL STRIP: ABNORMAL
WBC # UR STRIP: ABNORMAL /HPF
WBC NRBC COR # BLD: 7.61 10*3/MM3 (ref 3.4–10.8)

## 2022-06-29 PROCEDURE — 25010000002 ONDANSETRON PER 1 MG: Performed by: EMERGENCY MEDICINE

## 2022-06-29 PROCEDURE — 83690 ASSAY OF LIPASE: CPT | Performed by: EMERGENCY MEDICINE

## 2022-06-29 PROCEDURE — 99283 EMERGENCY DEPT VISIT LOW MDM: CPT

## 2022-06-29 PROCEDURE — 96376 TX/PRO/DX INJ SAME DRUG ADON: CPT

## 2022-06-29 PROCEDURE — 96375 TX/PRO/DX INJ NEW DRUG ADDON: CPT

## 2022-06-29 PROCEDURE — 74176 CT ABD & PELVIS W/O CONTRAST: CPT

## 2022-06-29 PROCEDURE — 85025 COMPLETE CBC W/AUTO DIFF WBC: CPT | Performed by: EMERGENCY MEDICINE

## 2022-06-29 PROCEDURE — 80053 COMPREHEN METABOLIC PANEL: CPT | Performed by: EMERGENCY MEDICINE

## 2022-06-29 PROCEDURE — 84703 CHORIONIC GONADOTROPIN ASSAY: CPT | Performed by: EMERGENCY MEDICINE

## 2022-06-29 PROCEDURE — 96374 THER/PROPH/DIAG INJ IV PUSH: CPT

## 2022-06-29 PROCEDURE — 25010000002 HYDROMORPHONE 1 MG/ML SOLUTION: Performed by: EMERGENCY MEDICINE

## 2022-06-29 PROCEDURE — 25010000002 MORPHINE PER 10 MG: Performed by: EMERGENCY MEDICINE

## 2022-06-29 PROCEDURE — 25010000002 KETOROLAC TROMETHAMINE PER 15 MG: Performed by: EMERGENCY MEDICINE

## 2022-06-29 PROCEDURE — 99211 OFF/OP EST MAY X REQ PHY/QHP: CPT

## 2022-06-29 PROCEDURE — 25010000002 HYDROMORPHONE PER 4 MG: Performed by: EMERGENCY MEDICINE

## 2022-06-29 PROCEDURE — 81001 URINALYSIS AUTO W/SCOPE: CPT | Performed by: EMERGENCY MEDICINE

## 2022-06-29 RX ORDER — HYDROCODONE BITARTRATE AND ACETAMINOPHEN 5; 325 MG/1; MG/1
1 TABLET ORAL EVERY 6 HOURS PRN
Qty: 12 TABLET | Refills: 0 | Status: SHIPPED | OUTPATIENT
Start: 2022-06-29

## 2022-06-29 RX ORDER — CEPHALEXIN 500 MG/1
500 CAPSULE ORAL 3 TIMES DAILY
Qty: 21 CAPSULE | Refills: 0 | Status: SHIPPED | OUTPATIENT
Start: 2022-06-29

## 2022-06-29 RX ORDER — SODIUM CHLORIDE 0.9 % (FLUSH) 0.9 %
10 SYRINGE (ML) INJECTION AS NEEDED
Status: DISCONTINUED | OUTPATIENT
Start: 2022-06-29 | End: 2022-06-29 | Stop reason: HOSPADM

## 2022-06-29 RX ORDER — KETOROLAC TROMETHAMINE 15 MG/ML
15 INJECTION, SOLUTION INTRAMUSCULAR; INTRAVENOUS ONCE
Status: COMPLETED | OUTPATIENT
Start: 2022-06-29 | End: 2022-06-29

## 2022-06-29 RX ORDER — ONDANSETRON 2 MG/ML
4 INJECTION INTRAMUSCULAR; INTRAVENOUS ONCE
Status: COMPLETED | OUTPATIENT
Start: 2022-06-29 | End: 2022-06-29

## 2022-06-29 RX ORDER — MORPHINE SULFATE 2 MG/ML
4 INJECTION, SOLUTION INTRAMUSCULAR; INTRAVENOUS ONCE
Status: COMPLETED | OUTPATIENT
Start: 2022-06-29 | End: 2022-06-29

## 2022-06-29 RX ORDER — ONDANSETRON 4 MG/1
4 TABLET, ORALLY DISINTEGRATING ORAL 4 TIMES DAILY PRN
Qty: 15 TABLET | Refills: 0 | Status: SHIPPED | OUTPATIENT
Start: 2022-06-29

## 2022-06-29 RX ORDER — HYDROMORPHONE HYDROCHLORIDE 1 MG/ML
0.5 INJECTION, SOLUTION INTRAMUSCULAR; INTRAVENOUS; SUBCUTANEOUS ONCE
Status: COMPLETED | OUTPATIENT
Start: 2022-06-29 | End: 2022-06-29

## 2022-06-29 RX ADMIN — ONDANSETRON 4 MG: 2 INJECTION INTRAMUSCULAR; INTRAVENOUS at 08:08

## 2022-06-29 RX ADMIN — KETOROLAC TROMETHAMINE 15 MG: 15 INJECTION, SOLUTION INTRAMUSCULAR; INTRAVENOUS at 09:21

## 2022-06-29 RX ADMIN — MORPHINE SULFATE 4 MG: 2 INJECTION, SOLUTION INTRAMUSCULAR; INTRAVENOUS at 08:10

## 2022-06-29 RX ADMIN — HYDROMORPHONE HYDROCHLORIDE 0.5 MG: 1 INJECTION, SOLUTION INTRAMUSCULAR; INTRAVENOUS; SUBCUTANEOUS at 09:22

## 2022-06-29 RX ADMIN — SODIUM CHLORIDE, POTASSIUM CHLORIDE, SODIUM LACTATE AND CALCIUM CHLORIDE 1000 ML: 600; 310; 30; 20 INJECTION, SOLUTION INTRAVENOUS at 08:46

## 2022-06-29 RX ADMIN — HYDROMORPHONE HYDROCHLORIDE 1 MG: 1 INJECTION, SOLUTION INTRAMUSCULAR; INTRAVENOUS; SUBCUTANEOUS at 08:47

## 2022-06-29 NOTE — ED TRIAGE NOTES
Pt reports she was dx with a kidney stone this morning and her pain has become unbearable. Pt reports she hasn't taken any pain medication since dc.     Pt was wearing a mask during assessment.  This RN wore appropriate PPE

## 2022-09-02 ENCOUNTER — HOSPITAL ENCOUNTER (EMERGENCY)
Facility: HOSPITAL | Age: 42
Discharge: HOME | End: 2022-09-03
Attending: EMERGENCY MEDICINE | Admitting: EMERGENCY MEDICINE

## 2022-09-02 DIAGNOSIS — B34.9 VIRAL SYNDROME: ICD-10-CM

## 2022-09-02 DIAGNOSIS — R52 BODY ACHES: Primary | ICD-10-CM

## 2022-09-02 PROCEDURE — 99283 EMERGENCY DEPT VISIT LOW MDM: CPT

## 2022-09-02 RX ORDER — IBUPROFEN 800 MG/1
800 TABLET ORAL ONCE
Status: COMPLETED | OUTPATIENT
Start: 2022-09-02 | End: 2022-09-03

## 2022-09-03 ENCOUNTER — APPOINTMENT (OUTPATIENT)
Dept: GENERAL RADIOLOGY | Facility: HOSPITAL | Age: 42
End: 2022-09-03

## 2022-09-03 VITALS
BODY MASS INDEX: 29.65 KG/M2 | RESPIRATION RATE: 12 BRPM | HEIGHT: 68 IN | HEART RATE: 83 BPM | SYSTOLIC BLOOD PRESSURE: 126 MMHG | DIASTOLIC BLOOD PRESSURE: 69 MMHG | OXYGEN SATURATION: 94 % | TEMPERATURE: 98.9 F

## 2022-09-03 PROCEDURE — 0202U NFCT DS 22 TRGT SARS-COV-2: CPT | Performed by: PHYSICIAN ASSISTANT

## 2022-09-03 PROCEDURE — 71045 X-RAY EXAM CHEST 1 VIEW: CPT

## 2022-09-03 RX ADMIN — IBUPROFEN 800 MG: 800 TABLET, FILM COATED ORAL at 00:09

## 2022-09-03 NOTE — ED PROVIDER NOTES
MD ATTESTATION NOTE    The АННА and I have discussed this patient's history, physical exam, and treatment plan.  I have reviewed the documentation and personally had a face to face interaction with the patient. I affirm the documentation and agree with the treatment and plan.  The attached note describes my personal findings.      I provided a substantive portion of the care of the patient.  I personally performed the physical exam in its entirety, and below are my findings.  For this patient encounter, the patient wore surgical mask, I wore full protective PPE including N95 and eye protection.      Brief HPI: This patient is a 41-year-old female presenting to the emergency room today with generalized body aches as well as a cough and chills that began fairly suddenly while at work tonight.  She denies any associated shortness of breath or nausea and vomiting.    PHYSICAL EXAM  ED Triage Vitals [09/02/22 2321]   Temp Heart Rate Resp BP SpO2   98.9 °F (37.2 °C) 102 16 144/93 95 %      Temp src Heart Rate Source Patient Position BP Location FiO2 (%)   Tympanic -- -- -- --         GENERAL: Resting comfortably and in no acute distress, nontoxic in appearance  HENT: nares patent  EYES: no scleral icterus  CV: regular rhythm, normal rate, no M/R/G  RESPIRATORY: normal effort, lungs clear bilaterally  ABDOMEN: soft, nontender, no rebound or guarding  MUSCULOSKELETAL: no deformity, no edema  NEURO: alert, moves all extremities, follows commands  PSYCH:  calm, cooperative  SKIN: warm, dry    Vital signs and nursing notes reviewed.        Plan: We will obtain RVP with COVID-19 testing as well as a chest x-ray in the emergency room today.  We will monitor and reassess following.       Juan J Alvarez MD  09/03/22 5564

## 2022-09-03 NOTE — ED TRIAGE NOTES
To ER via PV.  C/o chills and body aches that started approx 1800.    Pt in mask at time of triage. Triage staff in appropriate PPE.

## 2022-09-03 NOTE — ED PROVIDER NOTES
EMERGENCY DEPARTMENT ENCOUNTER    Room Number:  05/05  Date of encounter:  9/3/2022  PCP: Oh Lira APRN  Historian: Patient      HPI:  Chief Complaint: Bodyaches   A complete HPI/ROS/PMH/PSH/SH/FH are unobtainable due to: N/A    Context: Quin Carrington is a 41 y.o. female who presents to the ED c/o body aches, chills, headache, cough, and diarrhea.  Patient states that she was at work at around 6:00 when her symptoms hit her all of a sudden.  Patient states that people have been sick at work.  Denies any runny nose, sore throat, chest pain, shortness of breath, no UTI symptoms, abdominal pain, nausea or vomiting.      PAST MEDICAL HISTORY  Active Ambulatory Problems     Diagnosis Date Noted   • Acute bilateral low back pain without sciatica 12/22/2021   • Tobacco abuse 10/09/2019     Resolved Ambulatory Problems     Diagnosis Date Noted   • No Resolved Ambulatory Problems     Past Medical History:   Diagnosis Date   • Patient denies medical problems          PAST SURGICAL HISTORY  Past Surgical History:   Procedure Laterality Date   • TUBAL ABDOMINAL LIGATION           FAMILY HISTORY  Family History   Problem Relation Age of Onset   • No Known Problems Father    • No Known Problems Mother          SOCIAL HISTORY  Social History     Socioeconomic History   • Marital status: Single   Tobacco Use   • Smoking status: Current Every Day Smoker   • Smokeless tobacco: Never Used   • Tobacco comment: hasn't smoked in 2 weeks   Vaping Use   • Vaping Use: Never used   Substance and Sexual Activity   • Alcohol use: Not Currently   • Drug use: Yes     Types: Marijuana   • Sexual activity: Not Currently     Birth control/protection: Tubal ligation         ALLERGIES  Sulfamethoxazole and Sulfa antibiotics        REVIEW OF SYSTEMS  Review of Systems     All systems reviewed and negative except for those discussed in HPI.       PHYSICAL EXAM    I have reviewed the triage vital signs and nursing notes.    ED Triage  Vitals [09/02/22 2321]   Temp Heart Rate Resp BP SpO2   98.9 °F (37.2 °C) 102 16 144/93 95 %      Temp src Heart Rate Source Patient Position BP Location FiO2 (%)   Tympanic -- -- -- --       Physical Exam  GENERAL: Alert and oriented x3, not distressed  HENT: no pharyngeal erythema or tonsillar exudate, no TM erythema, no rhinorrhea  EYES: no scleral icterus  CV: regular rhythm, regular rate, no murmurs, rubs, or gallops  RESPIRATORY: normal effort, clear to auscultate bilaterally  ABDOMEN: soft  MUSCULOSKELETAL: no deformity  NEURO: alert, moves all extremities, follows commands  SKIN: warm, dry, no rashes      LAB RESULTS  Recent Results (from the past 24 hour(s))   Respiratory Panel PCR w/COVID-19(SARS-CoV-2) [facility] In-House, NP Swab in UTM/VTM, 3-4 HR TAT - Swab, Nasopharynx    Collection Time: 09/03/22 12:09 AM    Specimen: Nasopharynx; Swab   Result Value Ref Range    ADENOVIRUS, PCR Not Detected Not Detected    Coronavirus 229E Not Detected Not Detected    Coronavirus HKU1 Not Detected Not Detected    Coronavirus NL63 Not Detected Not Detected    Coronavirus OC43 Not Detected Not Detected    COVID19 Not Detected Not Detected - Ref. Range    Human Metapneumovirus Not Detected Not Detected    Human Rhinovirus/Enterovirus Not Detected Not Detected    Influenza A PCR Not Detected Not Detected    Influenza B PCR Not Detected Not Detected    Parainfluenza Virus 1 Not Detected Not Detected    Parainfluenza Virus 2 Not Detected Not Detected    Parainfluenza Virus 3 Not Detected Not Detected    Parainfluenza Virus 4 Not Detected Not Detected    RSV, PCR Not Detected Not Detected    Bordetella pertussis pcr Not Detected Not Detected    Bordetella parapertussis PCR Not Detected Not Detected    Chlamydophila pneumoniae PCR Not Detected Not Detected    Mycoplasma pneumo by PCR Not Detected Not Detected       Ordered the above labs and independently reviewed the results.        RADIOLOGY  XR Chest 1  View    Result Date: 9/3/2022  SINGLE VIEW OF THE CHEST  HISTORY: Cough and fever  COMPARISON: 12/23/2021  FINDINGS: Heart size is within normal limits. No pneumothorax or pleural effusion is seen. No acute infiltrates are identified. Background changes of COPD are suspected.      No acute findings.  This report was finalized on 9/3/2022 12:38 AM by Dr. Triny Livingston M.D.        I ordered the above noted radiological studies. Reviewed by me and discussed with radiologist.  See dictation for official radiology interpretation.      PROCEDURES    Procedures      MEDICATIONS GIVEN IN ER    Medications   ibuprofen (ADVIL,MOTRIN) tablet 800 mg (800 mg Oral Given 9/3/22 0009)         PROGRESS, DATA ANALYSIS, CONSULTS, AND MEDICAL DECISION MAKING    All labs have been independently reviewed by me.  All radiology studies have been reviewed by me and discussed with radiologist dictating the report.   EKG's independently viewed and interpreted by me.  Discussion below represents my analysis of pertinent findings related to patient's condition, differential diagnosis, treatment plan and final disposition.    DDx: Includes but limited to viral syndrome, influenza, COVID, pneumonia    ED Course as of 09/03/22 0130   Sat Sep 03, 2022   0117 Patient rechecked, resting comfortably in bed, no acute distress.  Discussed results, diagnosis, and treatment plan.  Patient expressed understanding and agrees with plan for discharge. [AINSLEY]      ED Course User Index  [AINSLEY] Wes Stroud PA       MDM: Patient's chest x-ray and respiratory viral panel swab were negative, patient's vital signs are stable and there is no evidence for an acute or emergent process.  We will treat with Tylenol or ibuprofen as needed for symptoms, and PCP follow-up as needed.  Vital signs are stable, patient is safe for discharge home.    PPE: The patient wore a surgical mask throughout the entire patient encounter. I wore an N95.    AS OF 01:30 EDT  VITALS:    BP - 119/69  HR - 81  TEMP - 98.9 °F (37.2 °C) (Tympanic)  O2 SATS - 100%        DIAGNOSIS  Final diagnoses:   Body aches   Viral syndrome         DISPOSITION  DISCHARGE    Patient discharged in stable condition.    Reviewed implications of results, diagnosis, meds, responsibility to follow up, warning signs and symptoms of possible worsening, potential complications and reasons to return to ER.    Patient/Family voiced understanding of above instructions.    Discussed plan for discharge, as there is no emergent indication for admission. Patient referred to primary care provider for BP management due to today's BP. Pt/family is agreeable and understands need for follow up and repeat testing.  Pt is aware that discharge does not mean that nothing is wrong but it indicates no emergency is present that requires admission and they must continue care with follow-up as given below or physician of their choice.     FOLLOW-UP  Oh Lira, APRN  [address] [address] [address]  [phone]    Schedule an appointment as soon as possible for a visit in 1 week           Medication List      No changes were made to your prescriptions during this visit.                  Wes Stroud PA  09/03/22 0130

## 2022-09-03 NOTE — DISCHARGE INSTRUCTIONS
Home, rest, keep well-hydrated, ibuprofen or Tylenol as needed, may use a COVID home test if symptoms persist.  Home medicine as prescribed, follow up with PCP for recheck. Return to care if symptoms worsen, develop chest pain, shortness of breath, or with further concerns.

## 2022-09-03 NOTE — ED NOTES
Patient discharged from ED with family.  Aox4, respirations even and unlabored, no s/s of distress noted.  No c/o at this time. Aftercare instructions provided and explained to patient and patient voiced understanding to this RN.  All patient questions answered at the time of DC, patient left ED with all belongings.  Ambulatory to ED lobby independently with family, steady gait, no LOB noted.